# Patient Record
Sex: MALE | Race: BLACK OR AFRICAN AMERICAN | Employment: OTHER | URBAN - METROPOLITAN AREA
[De-identification: names, ages, dates, MRNs, and addresses within clinical notes are randomized per-mention and may not be internally consistent; named-entity substitution may affect disease eponyms.]

---

## 2017-01-20 ENCOUNTER — HOSPITAL ENCOUNTER (OUTPATIENT)
Dept: CT IMAGING | Age: 72
End: 2017-01-20
Attending: INTERNAL MEDICINE
Payer: MEDICARE

## 2017-01-20 ENCOUNTER — HOSPITAL ENCOUNTER (OUTPATIENT)
Dept: CT IMAGING | Age: 72
Discharge: HOME | End: 2017-01-20
Attending: INTERNAL MEDICINE
Payer: MEDICARE

## 2017-01-20 DIAGNOSIS — C61 PROSTATE CANCER (HCC): ICD-10-CM

## 2017-01-20 LAB — CREAT UR-MCNC: 1 MG/DL (ref 0.6–1.3)

## 2017-01-20 PROCEDURE — 74011000255 HC RX REV CODE- 255: Performed by: INTERNAL MEDICINE

## 2017-01-20 PROCEDURE — 74177 CT ABD & PELVIS W/CONTRAST: CPT

## 2017-01-20 PROCEDURE — 74011636320 HC RX REV CODE- 636/320: Performed by: INTERNAL MEDICINE

## 2017-01-20 PROCEDURE — 82565 ASSAY OF CREATININE: CPT

## 2017-01-20 RX ORDER — BARIUM SULFATE 20 MG/ML
900 SUSPENSION ORAL
Status: COMPLETED | OUTPATIENT
Start: 2017-01-20 | End: 2017-01-20

## 2017-01-20 RX ADMIN — BARIUM SULFATE 900 ML: 21 SUSPENSION ORAL at 12:13

## 2017-01-20 RX ADMIN — IOPAMIDOL 80 ML: 612 INJECTION, SOLUTION INTRAVENOUS at 12:14

## 2017-01-23 ENCOUNTER — HOSPITAL ENCOUNTER (OUTPATIENT)
Dept: NUCLEAR MEDICINE | Age: 72
Discharge: HOME | End: 2017-01-23
Attending: INTERNAL MEDICINE
Payer: MEDICARE

## 2017-01-23 DIAGNOSIS — C61 PROSTATE CANCER (HCC): ICD-10-CM

## 2017-01-23 PROCEDURE — 78306 BONE IMAGING WHOLE BODY: CPT

## 2017-02-20 ENCOUNTER — HOSPITAL ENCOUNTER (INPATIENT)
Age: 72
LOS: 4 days | Discharge: HOME HEALTH | DRG: 190 | End: 2017-02-24
Attending: EMERGENCY MEDICINE | Admitting: HOSPITALIST
Payer: MEDICARE

## 2017-02-20 ENCOUNTER — APPOINTMENT (OUTPATIENT)
Dept: GENERAL RADIOLOGY | Age: 72
DRG: 190 | End: 2017-02-20
Attending: EMERGENCY MEDICINE
Payer: MEDICARE

## 2017-02-20 ENCOUNTER — APPOINTMENT (OUTPATIENT)
Dept: CT IMAGING | Age: 72
DRG: 190 | End: 2017-02-20
Attending: EMERGENCY MEDICINE
Payer: MEDICARE

## 2017-02-20 DIAGNOSIS — J44.1 ACUTE EXACERBATION OF CHRONIC OBSTRUCTIVE PULMONARY DISEASE (COPD) (HCC): ICD-10-CM

## 2017-02-20 DIAGNOSIS — J96.02 ACUTE RESPIRATORY FAILURE WITH HYPERCAPNIA (HCC): ICD-10-CM

## 2017-02-20 DIAGNOSIS — G40.909 RECURRENT SEIZURES (HCC): Primary | ICD-10-CM

## 2017-02-20 DIAGNOSIS — R79.1 SUPRATHERAPEUTIC INR: ICD-10-CM

## 2017-02-20 LAB
ALBUMIN SERPL BCP-MCNC: 3 G/DL (ref 3.4–5)
ALBUMIN/GLOB SERPL: 0.8 {RATIO} (ref 0.8–1.7)
ALP SERPL-CCNC: 56 U/L (ref 45–117)
ALT SERPL-CCNC: 34 U/L (ref 16–61)
ANION GAP BLD CALC-SCNC: 9 MMOL/L (ref 3–18)
APPEARANCE UR: CLEAR
APTT PPP: 34.5 SEC (ref 23–36.4)
ARTERIAL PATENCY WRIST A: YES
AST SERPL W P-5'-P-CCNC: 33 U/L (ref 15–37)
ATRIAL RATE: 111 BPM
BACTERIA URNS QL MICRO: ABNORMAL /HPF
BASE EXCESS BLD CALC-SCNC: 1 MMOL/L
BASOPHILS # BLD AUTO: 0 K/UL (ref 0–0.06)
BASOPHILS # BLD: 0 % (ref 0–2)
BDY SITE: ABNORMAL
BILIRUB SERPL-MCNC: 0.3 MG/DL (ref 0.2–1)
BILIRUB UR QL: NEGATIVE
BNP SERPL-MCNC: 4179 PG/ML (ref 0–900)
BODY TEMPERATURE: 98.6
BUN SERPL-MCNC: 24 MG/DL (ref 7–18)
BUN/CREAT SERPL: 21 (ref 12–20)
CALCIUM SERPL-MCNC: 8.6 MG/DL (ref 8.5–10.1)
CALCULATED R AXIS, ECG10: 58 DEGREES
CALCULATED T AXIS, ECG11: 118 DEGREES
CAOX CRY URNS QL MICRO: ABNORMAL
CHLORIDE SERPL-SCNC: 107 MMOL/L (ref 100–108)
CK MB CFR SERPL CALC: 3.2 % (ref 0–4)
CK MB CFR SERPL CALC: 4.1 % (ref 0–4)
CK MB SERPL-MCNC: 3 NG/ML (ref 0.5–3.6)
CK MB SERPL-MCNC: 3.3 NG/ML (ref 0.5–3.6)
CK SERPL-CCNC: 81 U/L (ref 39–308)
CK SERPL-CCNC: 95 U/L (ref 39–308)
CO2 SERPL-SCNC: 27 MMOL/L (ref 21–32)
COLOR UR: YELLOW
CREAT SERPL-MCNC: 1.15 MG/DL (ref 0.6–1.3)
DIAGNOSIS, 93000: NORMAL
DIFFERENTIAL METHOD BLD: ABNORMAL
EOSINOPHIL # BLD: 0 K/UL (ref 0–0.4)
EOSINOPHIL NFR BLD: 0 % (ref 0–5)
EPITH CASTS URNS QL MICRO: ABNORMAL /LPF (ref 0–5)
ERYTHROCYTE [DISTWIDTH] IN BLOOD BY AUTOMATED COUNT: 16.6 % (ref 11.6–14.5)
GAS FLOW.O2 O2 DELIVERY SYS: ABNORMAL L/MIN
GAS FLOW.O2 SETTING OXYMISER: 3 L/M
GLOBULIN SER CALC-MCNC: 3.9 G/DL (ref 2–4)
GLUCOSE SERPL-MCNC: 110 MG/DL (ref 74–99)
GLUCOSE UR STRIP.AUTO-MCNC: NEGATIVE MG/DL
GRAN CASTS URNS QL MICRO: ABNORMAL /LPF
HCO3 BLD-SCNC: 27.8 MMOL/L (ref 22–26)
HCT VFR BLD AUTO: 35.4 % (ref 36–48)
HGB BLD-MCNC: 10.9 G/DL (ref 13–16)
HGB UR QL STRIP: ABNORMAL
HYALINE CASTS URNS QL MICRO: ABNORMAL /LPF (ref 0–2)
INR PPP: 5.6 (ref 0.8–1.2)
KETONES UR QL STRIP.AUTO: NEGATIVE MG/DL
LACTATE BLD-SCNC: 1.5 MMOL/L (ref 0.4–2)
LACTATE BLD-SCNC: 7.4 MMOL/L (ref 0.4–2)
LEUKOCYTE ESTERASE UR QL STRIP.AUTO: NEGATIVE
LYMPHOCYTES # BLD AUTO: 17 % (ref 21–52)
LYMPHOCYTES # BLD: 1.2 K/UL (ref 0.9–3.6)
MCH RBC QN AUTO: 26.6 PG (ref 24–34)
MCHC RBC AUTO-ENTMCNC: 30.8 G/DL (ref 31–37)
MCV RBC AUTO: 86.3 FL (ref 74–97)
MONOCYTES # BLD: 0.4 K/UL (ref 0.05–1.2)
MONOCYTES NFR BLD AUTO: 6 % (ref 3–10)
MUCOUS THREADS URNS QL MICRO: ABNORMAL /LPF
NEUTS SEG # BLD: 5.4 K/UL (ref 1.8–8)
NEUTS SEG NFR BLD AUTO: 77 % (ref 40–73)
NITRITE UR QL STRIP.AUTO: NEGATIVE
O2/TOTAL GAS SETTING VFR VENT: 32 %
PCO2 BLD: 57.3 MMHG (ref 35–45)
PH BLD: 7.29 [PH] (ref 7.35–7.45)
PH UR STRIP: 5 [PH] (ref 5–8)
PLATELET # BLD AUTO: 218 K/UL (ref 135–420)
PMV BLD AUTO: 9.5 FL (ref 9.2–11.8)
PO2 BLD: 71 MMHG (ref 80–100)
POTASSIUM SERPL-SCNC: 3.7 MMOL/L (ref 3.5–5.5)
PROT SERPL-MCNC: 6.9 G/DL (ref 6.4–8.2)
PROT UR STRIP-MCNC: 100 MG/DL
PROTHROMBIN TIME: 47.4 SEC (ref 11.5–15.2)
Q-T INTERVAL, ECG07: 362 MS
QRS DURATION, ECG06: 72 MS
QTC CALCULATION (BEZET), ECG08: 503 MS
RBC # BLD AUTO: 4.1 M/UL (ref 4.7–5.5)
RBC #/AREA URNS HPF: NEGATIVE /HPF (ref 0–5)
SAO2 % BLD: 92 % (ref 92–97)
SERVICE CMNT-IMP: ABNORMAL
SODIUM SERPL-SCNC: 143 MMOL/L (ref 136–145)
SP GR UR REFRACTOMETRY: >1.03 (ref 1–1.03)
SPECIMEN TYPE: ABNORMAL
TOTAL RESP. RATE, ITRR: 23
TROPONIN I SERPL-MCNC: 0.05 NG/ML (ref 0–0.04)
TROPONIN I SERPL-MCNC: 0.09 NG/ML (ref 0–0.04)
UROBILINOGEN UR QL STRIP.AUTO: 1 EU/DL (ref 0.2–1)
VENTRICULAR RATE, ECG03: 116 BPM
WBC # BLD AUTO: 6.9 K/UL (ref 4.6–13.2)
WBC URNS QL MICRO: ABNORMAL /HPF (ref 0–4)

## 2017-02-20 PROCEDURE — 74011250636 HC RX REV CODE- 250/636

## 2017-02-20 PROCEDURE — 74011250636 HC RX REV CODE- 250/636: Performed by: PHYSICIAN ASSISTANT

## 2017-02-20 PROCEDURE — 74011250637 HC RX REV CODE- 250/637: Performed by: INTERNAL MEDICINE

## 2017-02-20 PROCEDURE — 94640 AIRWAY INHALATION TREATMENT: CPT

## 2017-02-20 PROCEDURE — 74011636637 HC RX REV CODE- 636/637: Performed by: EMERGENCY MEDICINE

## 2017-02-20 PROCEDURE — 77030013140 HC MSK NEB VYRM -A

## 2017-02-20 PROCEDURE — 96375 TX/PRO/DX INJ NEW DRUG ADDON: CPT

## 2017-02-20 PROCEDURE — 71010 XR CHEST PORT: CPT

## 2017-02-20 PROCEDURE — 85025 COMPLETE CBC W/AUTO DIFF WBC: CPT | Performed by: EMERGENCY MEDICINE

## 2017-02-20 PROCEDURE — 80177 DRUG SCRN QUAN LEVETIRACETAM: CPT | Performed by: EMERGENCY MEDICINE

## 2017-02-20 PROCEDURE — 74011250637 HC RX REV CODE- 250/637: Performed by: PHYSICIAN ASSISTANT

## 2017-02-20 PROCEDURE — 83880 ASSAY OF NATRIURETIC PEPTIDE: CPT | Performed by: PHYSICIAN ASSISTANT

## 2017-02-20 PROCEDURE — 70450 CT HEAD/BRAIN W/O DYE: CPT

## 2017-02-20 PROCEDURE — 80053 COMPREHEN METABOLIC PANEL: CPT | Performed by: EMERGENCY MEDICINE

## 2017-02-20 PROCEDURE — 99285 EMERGENCY DEPT VISIT HI MDM: CPT

## 2017-02-20 PROCEDURE — 87040 BLOOD CULTURE FOR BACTERIA: CPT | Performed by: EMERGENCY MEDICINE

## 2017-02-20 PROCEDURE — 74011250636 HC RX REV CODE- 250/636: Performed by: EMERGENCY MEDICINE

## 2017-02-20 PROCEDURE — 85730 THROMBOPLASTIN TIME PARTIAL: CPT | Performed by: EMERGENCY MEDICINE

## 2017-02-20 PROCEDURE — 74011000250 HC RX REV CODE- 250: Performed by: EMERGENCY MEDICINE

## 2017-02-20 PROCEDURE — 36600 WITHDRAWAL OF ARTERIAL BLOOD: CPT

## 2017-02-20 PROCEDURE — 85610 PROTHROMBIN TIME: CPT | Performed by: EMERGENCY MEDICINE

## 2017-02-20 PROCEDURE — 36415 COLL VENOUS BLD VENIPUNCTURE: CPT | Performed by: PHYSICIAN ASSISTANT

## 2017-02-20 PROCEDURE — 74011000258 HC RX REV CODE- 258: Performed by: PHYSICIAN ASSISTANT

## 2017-02-20 PROCEDURE — 82803 BLOOD GASES ANY COMBINATION: CPT

## 2017-02-20 PROCEDURE — 83605 ASSAY OF LACTIC ACID: CPT

## 2017-02-20 PROCEDURE — 74011000250 HC RX REV CODE- 250: Performed by: PHYSICIAN ASSISTANT

## 2017-02-20 PROCEDURE — 65660000000 HC RM CCU STEPDOWN

## 2017-02-20 PROCEDURE — 96365 THER/PROPH/DIAG IV INF INIT: CPT

## 2017-02-20 PROCEDURE — 82550 ASSAY OF CK (CPK): CPT | Performed by: PHYSICIAN ASSISTANT

## 2017-02-20 PROCEDURE — 93005 ELECTROCARDIOGRAM TRACING: CPT

## 2017-02-20 PROCEDURE — 81001 URINALYSIS AUTO W/SCOPE: CPT | Performed by: EMERGENCY MEDICINE

## 2017-02-20 RX ORDER — PREDNISONE 20 MG/1
60 TABLET ORAL
Status: COMPLETED | OUTPATIENT
Start: 2017-02-20 | End: 2017-02-20

## 2017-02-20 RX ORDER — FAMOTIDINE 20 MG/1
40 TABLET, FILM COATED ORAL 2 TIMES DAILY
Status: DISCONTINUED | OUTPATIENT
Start: 2017-02-20 | End: 2017-02-24 | Stop reason: HOSPADM

## 2017-02-20 RX ORDER — LOSARTAN POTASSIUM 50 MG/1
50 TABLET ORAL DAILY
COMMUNITY

## 2017-02-20 RX ORDER — ALBUTEROL SULFATE 0.83 MG/ML
2.5 SOLUTION RESPIRATORY (INHALATION)
Status: DISCONTINUED | OUTPATIENT
Start: 2017-02-20 | End: 2017-02-24 | Stop reason: HOSPADM

## 2017-02-20 RX ORDER — DILTIAZEM HYDROCHLORIDE 30 MG/1
30 TABLET, FILM COATED ORAL EVERY 6 HOURS
Status: DISCONTINUED | OUTPATIENT
Start: 2017-02-20 | End: 2017-02-21

## 2017-02-20 RX ORDER — LEVETIRACETAM 500 MG/5ML
INJECTION, SOLUTION, CONCENTRATE INTRAVENOUS
Status: COMPLETED
Start: 2017-02-20 | End: 2017-02-20

## 2017-02-20 RX ORDER — TAMSULOSIN HYDROCHLORIDE 0.4 MG/1
0.4 CAPSULE ORAL DAILY
Status: DISCONTINUED | OUTPATIENT
Start: 2017-02-21 | End: 2017-02-24 | Stop reason: HOSPADM

## 2017-02-20 RX ORDER — ONDANSETRON 2 MG/ML
4 INJECTION INTRAMUSCULAR; INTRAVENOUS
Status: DISCONTINUED | OUTPATIENT
Start: 2017-02-20 | End: 2017-02-24 | Stop reason: HOSPADM

## 2017-02-20 RX ORDER — LOSARTAN POTASSIUM 50 MG/1
50 TABLET ORAL DAILY
Status: DISCONTINUED | OUTPATIENT
Start: 2017-02-21 | End: 2017-02-24 | Stop reason: HOSPADM

## 2017-02-20 RX ORDER — LEVOFLOXACIN 5 MG/ML
500 INJECTION, SOLUTION INTRAVENOUS EVERY 24 HOURS
Status: DISCONTINUED | OUTPATIENT
Start: 2017-02-20 | End: 2017-02-24 | Stop reason: CLARIF

## 2017-02-20 RX ORDER — PREDNISONE 20 MG/1
20 TABLET ORAL DAILY
COMMUNITY
End: 2017-02-24

## 2017-02-20 RX ORDER — LEVETIRACETAM 10 MG/ML
1000 INJECTION INTRAVASCULAR ONCE
Status: ACTIVE | OUTPATIENT
Start: 2017-02-20 | End: 2017-02-20

## 2017-02-20 RX ORDER — IPRATROPIUM BROMIDE AND ALBUTEROL SULFATE 2.5; .5 MG/3ML; MG/3ML
3 SOLUTION RESPIRATORY (INHALATION)
Status: DISCONTINUED | OUTPATIENT
Start: 2017-02-20 | End: 2017-02-24 | Stop reason: HOSPADM

## 2017-02-20 RX ORDER — DILTIAZEM HYDROCHLORIDE 5 MG/ML
10 INJECTION INTRAVENOUS ONCE
Status: COMPLETED | OUTPATIENT
Start: 2017-02-20 | End: 2017-02-20

## 2017-02-20 RX ORDER — IPRATROPIUM BROMIDE AND ALBUTEROL SULFATE 2.5; .5 MG/3ML; MG/3ML
3 SOLUTION RESPIRATORY (INHALATION)
Status: COMPLETED | OUTPATIENT
Start: 2017-02-20 | End: 2017-02-20

## 2017-02-20 RX ORDER — ACETAMINOPHEN 325 MG/1
650 TABLET ORAL
Status: DISCONTINUED | OUTPATIENT
Start: 2017-02-20 | End: 2017-02-24 | Stop reason: HOSPADM

## 2017-02-20 RX ORDER — RANITIDINE 150 MG/1
150 TABLET, FILM COATED ORAL 2 TIMES DAILY
COMMUNITY

## 2017-02-20 RX ADMIN — DILTIAZEM HYDROCHLORIDE 30 MG: 30 TABLET, FILM COATED ORAL at 18:10

## 2017-02-20 RX ADMIN — LEVETIRACETAM 1000 MG: 100 INJECTION, SOLUTION INTRAVENOUS at 04:48

## 2017-02-20 RX ADMIN — SODIUM CHLORIDE 1000 ML: 900 INJECTION, SOLUTION INTRAVENOUS at 04:37

## 2017-02-20 RX ADMIN — SODIUM CHLORIDE 5 MG/HR: 900 INJECTION, SOLUTION INTRAVENOUS at 13:39

## 2017-02-20 RX ADMIN — LEVETIRACETAM 750 MG: 250 TABLET, FILM COATED ORAL at 22:08

## 2017-02-20 RX ADMIN — IPRATROPIUM BROMIDE AND ALBUTEROL SULFATE 3 ML: .5; 3 SOLUTION RESPIRATORY (INHALATION) at 09:43

## 2017-02-20 RX ADMIN — ALBUTEROL SULFATE 2.5 MG: 2.5 SOLUTION RESPIRATORY (INHALATION) at 09:48

## 2017-02-20 RX ADMIN — IPRATROPIUM BROMIDE AND ALBUTEROL SULFATE 3 ML: .5; 3 SOLUTION RESPIRATORY (INHALATION) at 21:45

## 2017-02-20 RX ADMIN — PREDNISONE 60 MG: 20 TABLET ORAL at 09:47

## 2017-02-20 RX ADMIN — LEVOFLOXACIN 500 MG: 5 INJECTION, SOLUTION INTRAVENOUS at 21:45

## 2017-02-20 RX ADMIN — DILTIAZEM HYDROCHLORIDE 10 MG: 5 INJECTION INTRAVENOUS at 13:38

## 2017-02-20 RX ADMIN — FAMOTIDINE 40 MG: 20 TABLET ORAL at 22:08

## 2017-02-20 RX ADMIN — PHYTONADIONE 10 MG: 10 INJECTION, EMULSION INTRAMUSCULAR; INTRAVENOUS; SUBCUTANEOUS at 13:28

## 2017-02-20 NOTE — ED NOTES
Purposeful rounding completed:    Side rails up x 2:  YES  Bed in low position and wheels locked: YES  Call bell within reach: YES  Comfort addressed: YES    Toileting needs addressed: YES  Plan of care reviewed/updated with patient and or family members: YES  IV site assessed: YES  Pain assessed and addressed: YES, 0  Pt used the urinal. Urine specimen obtained and sent to the lab.

## 2017-02-20 NOTE — H&P
2 [facility]  Hospitalist Division      History & Physical    Patient: Ellen Fletcher MRN: [id number]  [facility]: [id number]    YOB: 1945  Age: 66 y.o. Sex: male    DOA: 2/20/2017 LOS:  LOS: 0 days        DOA:  2/20/2017  PCP:  Rodolfo So MD    Chief Complaint:  Seizure    Assessment/ Plan:     Patient Active Problem List   Diagnosis Code    Malignant neoplasm of prostate ([facility] C61    History of pulmonary embolism Z86.711    Seizure ([facility] R56.9    HTN (hypertension) I10    [facility] J44.9    COPD with acute exacerbation ([facility] J44.1       A/P:  - New onset AFIB with RVR - Continue Cardizem gtt. Check Echo  - Acute COPD exacerbation - Continue scheduled Duo-Nebs/ solumedrol. Start Levaquin now for exacerbation and if there is any concern of aspiration with seizure this am.   - Acute on chronic Hypoxic respiratory failure on Home O2  - Seizure disorder - Keppra bolus in the ER. Continue Keppra regimen.   - Coagulopathy - With seizure precautions at this point, give one dose Vitamin K now and follow INR.   - Hx of PE in 2015 - Hold Coumadin for now with elevated INR  - HTN - Continue Cozaar  - BPH - Continue Flomax  - SCD's for DVT Prophylaxis      HPI:     Ellen Fletcher is a 66 y.o. male with a hx of COPD, Chronic Hypoxic respiratory failure on Home O2, HTN, Prostate Cancer, and seizure disorder who was admitted to [facility] on 2/20/17 for AFIB with RVR and a COPD exacerbation. He was initially brought in to the ER this morning after having a witnessed seizure at home and was brought in by EMS. His wife states he has been taking his Keppra as prescribed and ran out of it yesterday. He denies any headache, fever, chills, vision changes, numbness or weakness. When he woke up from his post-ictal state he was wheezing and experiencing some shortness of breath. He denied any chest pain.  He was given Neb treatments and he improved. He is chronically on Home O2 and has been taking a course of steroids as prescribed by his PCP with [facility]. On top of his other issues it was noted that with activity his heart rate jumped up to the 150's. An EKG then revealed AFIB with RVR and he was started on Cardizem. He will be admitted to Telemetry for further evaluation and treatment. Past Medical History   Diagnosis Date    Anemia     COPD (chronic obstructive pulmonary disease) ([facility]     Dyspepsia and other specified disorders of function of stomach     Elevated PSA     Emphysema     Hypertension     Prostate cancer ([facility] [address] 9/27/10       Past Surgical History   Procedure Laterality Date    Hx urological  9/27/10     Prostate Bx, Dr. Willis Host       Family History   Problem Relation Age of Onset    No Known Problems Mother     No Known Problems Father        Social History     Social History    Marital status:      Spouse name: N/A    Number of children: N/A    Years of education: N/A     Social History Main Topics    Smoking status: Former Smoker     Types: Cigarettes    Smokeless tobacco: Never Used    Alcohol use No    Drug use: No    Sexual activity: No     Other Topics Concern    None     Social History Narrative       Prior to Admission medications    Medication Sig Start Date End Date Taking? Authorizing Provider   losartan (COZAAR) 50 mg tablet Take 50 mg by mouth daily. Yes Biju Sommers MD   raNITIdine (ZANTAC) 150 mg tablet Take 150 mg by mouth two (2) times a day. Yes Biju Sommers MD   predniSONE (DELTASONE) 20 mg tablet Take 20 mg by mouth daily. Yes Biju Sommers MD   levETIRAcetam (KEPPRA) 750 mg tablet Take 1 Tab by mouth two (2) times a day. 9/15/16  Yes Rita Deng MD   tamsulosin [facility]) 0.4 mg capsule Take 1 Cap by mouth daily. 7/21/16  Yes Brian Sanchez NP   warfarin (COUMADIN) 1 mg tablet Take 6 mg by mouth daily.    Yes Biju Sommers MD       No Known Allergies    Review of Systems:  - fever, - chills, - fatigue, - weight loss, - night sweats   - sore throat, - sinus congestion, - lymphadenopathy, - vision changes  - CP, -  palpitations  + shortness of breath, - cough, - hemoptysis  - nausea, - vomiting, - diarrhea, - abdominal pain, - reflux, - dysphagia  - dysuria, - hematuria, - urinary frequency  - rash, - pruritis  - back pain, - neck pain, - myalgia, - arthralgia  - H/A, - numbness, - tingling, - weakness, - slurred speech    Physical Exam:      Visit Vitals    BP (!) 144/92    Pulse 85    Temp 97.5 °F (36.4 °C)    Resp 18    Wt 83.9 kg (185 lb)    SpO2 98%    BMI 23.75 kg/m2       Physical Exam:  Gen: In general, this is a well nourished elderly male, in no acute distress on NC.  HEENT: Sclerae nonicteric. Oral mucous membranes moist.    Neck: Supple with midline trachea. CV: Irregularly irregular without murmur or rub appreciated. Resp:Respirations are unlabored without use of accessory muscles. Lung fields bilaterally with few expiratory wheezes bilateral lung fields. Abd: Soft, nontender, nondistended. Extrem: Extremities are warm, without cyanosis or clubbing. No pitting pretibial edema. Palpable distal pulses X 4.   Skin: Warm, no visible rashes. Neuro: Patient is alert, oriented, and cooperative. No obvious focal defects. Moves all 4 extremities.     Labs Reviewed:    Recent Results (from the past 24 hour(s))   CULTURE, BLOOD    Collection Time: 02/20/17  3:58 AM   Result Value Ref Range    Special Requests: NO SPECIAL REQUESTS      Culture result: NO GROWTH AFTER 4 HOURS     CBC WITH AUTOMATED DIFF    Collection Time: 02/20/17  4:00 AM   Result Value Ref Range    WBC 6.9 4.6 - 13.2 K/uL    RBC 4.10 (L) 4.70 - 5.50 M/uL    HGB 10.9 (L) 13.0 - 16.0 g/dL    HCT 35.4 (L) 36.0 - 48.0 %    MCV 86.3 74.0 - 97.0 FL    MCH 26.6 24.0 - 34.0 PG    MCHC 30.8 (L) 31.0 - 37.0 g/dL    RDW 16.6 (H) 11.6 - 14.5 %    PLATELET 826 505 - 301 K/uL    MPV 9.5 9.2 - 11.8 FL    NEUTROPHILS 77 (H) 40 - 73 %    LYMPHOCYTES 17 (L) 21 - 52 %    MONOCYTES 6 3 - 10 %    EOSINOPHILS 0 0 - 5 %    BASOPHILS 0 0 - 2 %    ABS. NEUTROPHILS 5.4 1.8 - 8.0 K/UL    ABS. LYMPHOCYTES 1.2 0.9 - 3.6 K/UL    ABS. MONOCYTES 0.4 0.05 - 1.2 K/UL    ABS. EOSINOPHILS 0.0 0.0 - 0.4 K/UL    ABS. BASOPHILS 0.0 0.0 - 0.06 K/UL    DF AUTOMATED     METABOLIC PANEL, COMPREHENSIVE    Collection Time: 02/20/17  4:00 AM   Result Value Ref Range    Sodium 143 136 - 145 mmol/L    Potassium 3.7 3.5 - 5.5 mmol/L    Chloride 107 100 - 108 mmol/L    CO2 27 21 - 32 mmol/L    Anion gap 9 3.0 - 18 mmol/L    Glucose 110 (H) 74 - 99 mg/dL    BUN 24 (H) 7.0 - 18 MG/DL    Creatinine 1.15 0.6 - 1.3 MG/DL    BUN/Creatinine ratio 21 (H) 12 - 20      GFR est AA >60 >60 ml/min/1.73m2    GFR est non-AA >60 >60 ml/min/1.73m2    Calcium 8.6 8.5 - 10.1 MG/DL    Bilirubin, total 0.3 0.2 - 1.0 MG/DL    ALT (SGPT) 34 16 - 61 U/L    AST (SGOT) 33 15 - 37 U/L    Alk.  phosphatase 56 45 - 117 U/L    Protein, total 6.9 6.4 - 8.2 g/dL    Albumin 3.0 (L) 3.4 - 5.0 g/dL    Globulin 3.9 2.0 - 4.0 g/dL    A-G Ratio 0.8 0.8 - 1.7     PROTHROMBIN TIME + INR    Collection Time: 02/20/17  4:00 AM   Result Value Ref Range    Prothrombin time 47.4 (H) 11.5 - 15.2 sec    INR 5.6 (HH) 0.8 - 1.2     PTT    Collection Time: 02/20/17  4:00 AM   Result Value Ref Range    aPTT 34.5 23.0 - 36.4 SEC   CULTURE, BLOOD    Collection Time: 02/20/17  4:06 AM   Result Value Ref Range    Special Requests: NO SPECIAL REQUESTS      Culture result: NO GROWTH AFTER 4 HOURS     POC LACTIC ACID    Collection Time: 02/20/17  4:28 AM   Result Value Ref Range    Lactic Acid (POC) 7.4 (HH) 0.4 - 2.0 mmol/L   URINALYSIS W/ RFLX MICROSCOPIC    Collection Time: 02/20/17  5:09 AM   Result Value Ref Range    Color YELLOW      Appearance CLEAR      Specific gravity >1.030 (H) 1.005 - 1.030    pH (UA) 5.0 5.0 - 8.0      Protein 100 (A) NEG mg/dL    Glucose NEGATIVE  NEG mg/dL    Ketone NEGATIVE  NEG mg/dL Bilirubin NEGATIVE  NEG      Blood TRACE (A) NEG      Urobilinogen 1.0 0.2 - 1.0 EU/dL    Nitrites NEGATIVE  NEG      Leukocyte Esterase NEGATIVE  NEG     URINE MICROSCOPIC ONLY    Collection Time: 02/20/17  5:09 AM   Result Value Ref Range    WBC 0 to 3 0 - 4 /hpf    RBC NEGATIVE  0 - 5 /hpf    Epithelial cells FEW 0 - 5 /lpf    Bacteria FEW (A) NEG /hpf    Mucus FEW (A) NEG /lpf    CA Oxalate crystals FEW (A) NEG      Hyaline cast 4 to 10 0 - 2 /lpf    Granular cast 0 to 1 NEG /lpf   POC LACTIC ACID    Collection Time: 02/20/17  7:11 AM   Result Value Ref Range    Lactic Acid (POC) 1.5 0.4 - 2.0 mmol/L   POC G3    Collection Time: 02/20/17 11:39 AM   Result Value Ref Range    Device: NASAL CANNULA      Flow rate (POC) 3 L/M    FIO2 (POC) 32 %    pH (POC) 7.294 (L) 7.35 - 7.45      pCO2 (POC) 57.3 (H) 35.0 - 45.0 MMHG    pO2 (POC) 71 (L) 80 - 100 MMHG    HCO3 (POC) 27.8 (H) 22 - 26 MMOL/L    sO2 (POC) 92 92 - 97 %    Base excess (POC) 1 mmol/L    Allens test (POC) YES      Total resp. rate 23      Site RIGHT RADIAL      Patient temp.  98.6      Specimen type (POC) ARTERIAL      Performed by Loran Meckel    EKG, 12 LEAD, SUBSEQUENT    Collection Time: 02/20/17 12:53 PM   Result Value Ref Range    Ventricular Rate 116 BPM    Atrial Rate 111 BPM    QRS Duration 72 ms    Q-T Interval 362 ms    QTC Calculation (Bezet) 503 ms    Calculated R Axis 58 degrees    Calculated T Axis 118 degrees    Diagnosis       Atrial fibrillation with rapid ventricular response with premature   ventricular or aberrantly conducted complexes  Nonspecific ST and T wave abnormality , probably digitalis effect  Abnormal ECG  When compared with ECG of 16-DEC-2016 07:01,  Atrial fibrillation has replaced Sinus rhythm     PRO-BNP    Collection Time: 02/20/17  1:31 PM   Result Value Ref Range    NT pro-BNP 4179 (H) 0 - 900 PG/ML   CARDIAC PANEL,(CK, CKMB & TROPONIN)    Collection Time: 02/20/17  1:31 PM   Result Value Ref Range    CK 81 39 - 308 U/L    CK - MB 3.3 0.5 - 3.6 ng/ml    CK-MB Index 4.1 (H) 0.0 - 4.0 %    Troponin-I, Qt. 0.09 (H) 0.0 - 0.045 NG/ML       Imaging Reviewed:    CXR  2/20/17  IMPRESSION:  No acute changes. Extensive bilateral granulomatous change and pleural  calcifications along with chronic pleural thickening and volume loss left  hemithorax    CT Head  2/20/17  IMPRESSION:   1. Nonspecific deep white matter changes about the same. This could represent  ischemic demyelination. 2. Old right basal ganglia/internal capsule lacunar infarction. 3. No acute bleed or acute CVA. 4. Cerebral atherosclerosis      [facility]  Hospitalist Division  Pager:  [phone]  Office:  [phone]

## 2017-02-20 NOTE — CONSULTS
[facility] - Consult Note    Date of  Admission: 2/20/2017  4:02 AM   Primary Care Physician:  Joselin Erazo MD    I saw, evaluated, interviewed and examined the patient personally. I agree with the findings and plan of care as documented below with PAClaudiaC note  Some changes made to original note. Transition to PO CCB ( done)  Wean off IV CCB drip once oral dose started  ECHO to eval EF  Already on coumadin. Marivel Gallardo MD           Assessment:     - Atrial fibrillation with RVR. New diagnosis. CHADS score: 1 (HTN) at this time. - Seizure disorder with witness seizure prior to admission  - Acute COPD exacerbation. On home O2 for COPD  - Hypertension   - History of pulmonary embolus  - BPH     Plan:     - Afib rate controlled now on Cardizem drip. Will add PO CCB and avoid BB for now with history of wheezing  - Already covered with Coumadin with history of PE. Supra-therapeutic INR 5.6 and Vitamin K given due to concerns with seizure   - Echocardiogram pending  - Respiratory treatments per primary team      History of Present Illness: This is a 66 y.o. male admitted for COPD with acute exacerbation ([facility] Patient complains of:  Seizure, chronic dyspnea     Mr. Princess Gregory is a very pleasant 66year old that presented to [facility] today due to witness seizure overnight. He has known seizure disorder and is on Keppra. He does not recall any events during this time, but his wife describes seeing him shaking violently and frothing at the mouth. He was post-ictal thereafter but then was experiencing some wheezing and shortness of breath. Cardiology is now participating in his care due to atrial fibrillation confirmed in the ED. Patient and wife deny any known history of afib. He is on Coumadin for prior PE in 2015. Denies history of stroke, CAD, or MI. He denies any recent chest pain, palpitations or syncope. He has chronic dyspnea and is on home O2.       Cardiac risk factors: male gender, hypertension      Review of Symptoms:  Except as stated above include:  Constitutional:  negative  Respiratory:  negative  Cardiovascular:  Per HPI   Gastrointestinal: negative  Genitourinary:  negative  Musculoskeletal:  Negative  Neurological:  Negative  Dermatological:  Negative  Endocrinological: Negative  Psychological:  Negative    A comprehensive review of systems was negative except for that written in the HPI.      Past Medical History:     Past Medical History   Diagnosis Date    Anemia     [facility]     Dyspepsia and other specified disorders of function of stomach     Elevated PSA     Emphysema     Hypertension     Prostate cancer ([facility] 9/27/10         Social History:     Social History     Social History    Marital status:      Spouse name: N/A    Number of children: N/A    Years of education: N/A     Social History Main Topics    Smoking status: Former Smoker     Types: Cigarettes    Smokeless tobacco: Never Used    Alcohol use No    Drug use: No    Sexual activity: No     Other Topics Concern    None     Social History Narrative        Family History:     Family History   Problem Relation Age of Onset    No Known Problems Mother     No Known Problems Father         Medications:   No Known Allergies     Current Facility-Administered Medications   Medication Dose Route Frequency    levETIRAcetam (KEPPRA) 1,000 mg in 100 ml IVPB  1,000 mg IntraVENous ONCE    albuterol (PROVENTIL VENTOLIN) nebulizer solution 2.5 mg  2.5 mg Nebulization Q15MIN PRN    dilTIAZem (CARDIZEM) 100 mg in 0.9% sodium chloride (MBP/ADV) 100 mL infusion  5 mg/hr IntraVENous CONTINUOUS         Physical Exam:     Visit Vitals    BP (!) 156/91 (BP 1 Location: Right arm)    Pulse (!) 102    Temp 97.9 °F (36.6 °C)    Resp 24    Wt 185 lb (83.9 kg)    SpO2 94%    BMI 23.75 kg/m2     BP Readings from Last 3 Encounters:   02/20/17 (!) 156/91   12/16/16 145/86 09/15/16 128/79     Pulse Readings from Last 3 Encounters:   02/20/17 (!) 102   12/16/16 (!) 102   09/15/16 86     Wt Readings from Last 3 Encounters:   02/20/17 185 lb (83.9 kg)   12/16/16 175 lb (79.4 kg)   09/15/16 190 lb (86.2 kg)       General:  fatigued, cooperative, no distress  Neck:  nontender, no JVD  Lungs:  Some coarse breath sounds  Heart:  regular rate and rhythm, S1, S2 normal, no murmur, click, rub or gallop  Abdomen:  abdomen is soft without significant tenderness, masses, organomegaly or guarding  Extremities:  extremities normal, atraumatic, no cyanosis or edema  Skin: Warm and dry.  no hyperpigmentation, vitiligo, or suspicious lesions  Neuro: alert, oriented x3, affect appropriate  Psych: non focal     Data Review:     Recent Labs      02/20/17   0400   WBC  6.9   HGB  10.9*   HCT  35.4*   PLT  218     Recent Labs      02/20/17   0400   NA  143   K  3.7   CL  107   CO2  27   GLU  110*   BUN  24*   CREA  1.15   CA  8.6   ALB  3.0*   SGOT  33   ALT  34   INR  5.6*       Results for orders placed or performed during the hospital encounter of 12/16/16   EKG, 12 LEAD, INITIAL   Result Value Ref Range    Ventricular Rate 100 BPM    Atrial Rate 100 BPM    P-R Interval 196 ms    QRS Duration 76 ms    Q-T Interval 362 ms    QTC Calculation (Bezet) 466 ms    Calculated P Axis 53 degrees    Calculated R Axis 38 degrees    Calculated T Axis 95 degrees    Diagnosis       Normal sinus rhythm  Septal infarct , age undetermined  Abnormal ECG  When compared with ECG of 15-SEP-2016 05:06,  premature supraventricular complexes are no longer present  Septal infarct is now present  Confirmed by Claudia Regalado ([id number]) on 12/16/2016 4:08:27 PM         All Cardiac Markers in the last 24 hours:    Lab Results   Component Value Date/Time    CPK 81 02/20/2017 01:31 PM    CKMB 3.3 02/20/2017 01:31 PM    CKND1 4.1 (H) 02/20/2017 01:31 PM    TROIQ 0.09 (H) 02/20/2017 01:31 PM       Last Lipid:    Lab Results   Component Value Date/Time    Cholesterol, total 166 04/29/2011 10:19 AM    HDL Cholesterol 57 04/29/2011 10:19 AM    LDL, calculated 95.6 04/29/2011 10:19 AM    Triglyceride 67 04/29/2011 10:19 AM    CHOL/HDL Ratio 2.9 04/29/2011 10:19 AM       Signed By: Colonel Ricci MD     February 20, 2017

## 2017-02-20 NOTE — PROGRESS NOTES
1700 Received patient from ER. Diagnosis of Afibb and COPD exacerbation. Patient is on seizure precautions and bed padded and in low position. Wife at bedside. Cardizem drip discontinued and stated on PO cardizem. Patient eating dinner at this time. Patient denies any pain or discomfort at this time. Assisted patient with the urinal. 300ml of clear/yellow urine voided. Will continue to monitor. 1830 All evening medication given and tolerated. IV sites flushed and capped. Patient now sleeping and bed in low position    1900 Bedside shift report given to [address] [place] [address] by Caprice Ley RN off going nurse.

## 2017-02-20 NOTE — ED TRIAGE NOTES
Patient had a witnessed seizure by wife. Patient was incont of urine. EMS found patient altered and lethargic.

## 2017-02-20 NOTE — ED NOTES
Called to bedside by nursing staff at 0930 hrs.: Patient elected to be discharged, has difficulty in breathing, saturation is normal on his normal amount of oxygen, he is sitting upright tachypneic speaking and moderately long sentences wheezing in all lung fields diminished bibasilar    Long history of tobacco use and COPD, does not follow with a pulmonologist we'll treat here with albuterol DuoNeb and prednisone monitor    ABG pH 7.294/57/71 on 3 L/m nasal cannula. This respiratory acidosis, patient will be admitted to medicine for further breathing treatments and management      Patient's presentation, history, physical exam and laboratory evaluations were reviewed. I felt the patient would benefit from inpatient management and treatment. Consult:  Discussed care with LIZETH morse. Standard discussion; including history of patients chief complaint, available diagnostic results, and treatment course. Patient was accepted to their service. Disposition:    Admitted to medicine service      Portions of this chart were created with Dragon medical speech to text program.   Unrecognized errors may be present.

## 2017-02-20 NOTE — Clinical Note
STOP YOUR WARFARIN FOR THE NEXT 2 DAYS. CALL YOUR PRIMARY CARE DOCTOR TODAY FOR APPOINTMENT IN THE NEXT 3 DAYS FOR WARFARIN BLOOD LEVEL CHECK. If you were prescribed any medication take as directed. Follow up with your primary care physician or with  specialist as directed. Return to the emergency room with any new or worsening conditions.

## 2017-02-20 NOTE — ED NOTES
Report given to Gaye Florence RN. Pt lying in bed, awake. No acute distress noted. Seizure precautions remained throughout shift. Family at bedside.

## 2017-02-20 NOTE — ED PROVIDER NOTES
HPI Comments: 4:04 AM Christianne Barnett is a 66 y.o. male w/ a hx of HTN, anemia, COPD, and prostate cancer presenting to the ED via EMS transport with seizures. EMS reports that the patient's wife states that she witnessed the seizure. Upon EMS arrival patient was mentally altered, lethargic, and incontinent of urine. Per wife, patient takes keppra seizure medication. Per recorded ED history, patient has been treated 4x for seizures and an anti-coagulant overdose in the past. The patient also c/o dizziness. There are no other concerns at this time. The history is provided by the patient. Past Medical History:   Diagnosis Date    Anemia     [facility]ronic obstructive [facility] disease) [facility]     Dyspepsia and other specified disorders of function of stomach     Elevated PSA     Emphysema     Hypertension     Prostate cancer ([facility] [address] 9/27/10       Past Surgical History:   Procedure Laterality Date    Hx urological  9/27/10     Prostate Bx, Dr. Nanci Thomas         Family History:   Problem Relation Age of Onset    No Known Problems Mother     No Known Problems Father        Social History     Social History    Marital status:      Spouse name: N/A    Number of children: N/A    Years of education: N/A     Occupational History    Not on file. Social History Main Topics    Smoking status: Former Smoker     Types: Cigarettes    Smokeless tobacco: Never Used    Alcohol use No    Drug use: No    Sexual activity: No     Other Topics Concern    Not on file     Social History Narrative         ALLERGIES: Review of patient's allergies indicates no known allergies. Review of Systems   Constitutional: Negative for fever. HENT: Negative for sore throat. Eyes: Negative for redness and visual disturbance. Respiratory: Negative for cough, shortness of breath and wheezing. Cardiovascular: Negative for chest pain. Gastrointestinal: Negative for abdominal pain and nausea.    Endocrine: Negative for polyuria. Genitourinary: Negative for dysuria. Musculoskeletal: Negative for arthralgias and neck stiffness. Skin: Negative for rash. Neurological: Positive for dizziness. Negative for headaches. All other systems reviewed and are negative. Vitals:    02/20/17 0530 02/20/17 0600 02/20/17 0715 02/20/17 0737   BP: (!) 157/93 (!) 146/97 (!) 126/97    Pulse: (!) 107 (!) 107 (!) 110    Resp: 20 25 22    Temp:    97.5 °F (36.4 °C)   SpO2:  100% 97%    Weight:                Physical Exam   Constitutional: He appears well-developed and well-nourished. No distress. HENT:   Head: Normocephalic and atraumatic. Mouth/Throat: Oropharynx is clear and moist.   Tongue laceration    Eyes: Conjunctivae are normal. Pupils are equal, round, and reactive to light. No scleral icterus. Neck: Normal range of motion. Neck supple. Cardiovascular: Intact distal pulses. Tachycardia present. Capillary refill < 3 seconds   Pulmonary/Chest: Effort normal. Tachypnea (mild) noted. No respiratory distress. He has no wheezes. He has rhonchi. Abdominal: Soft. Bowel sounds are normal. He exhibits no distension. There is no tenderness. Musculoskeletal: Normal range of motion. He exhibits no edema. No drifting of bilateral upper extremities, strength 5/5    Lymphadenopathy:     He has no cervical adenopathy. Neurological: He is alert. He has normal reflexes. No cranial nerve deficit. He exhibits normal muscle tone. Coordination normal.   Oriented x2, knows name and place but not the year. Appear post ictal.   Skin: Skin is warm and dry. He is not diaphoretic. Nursing note and vitals reviewed. MDM  Number of Diagnoses or Management Options  Recurrent seizures ([address]):   Supratherapeutic INR:   Diagnosis management comments: ddx recurrent seizure, metabolic, infectious, dehydration, cva    Tachycardia (appears post ictal). Pt nontoxic appearing.    Labs, IVF bolus, UA    I gave IV keppra dose in ED    LA 7.4 likely from seizure, consider infection  WBC wnl  UA (-)nitrites (-)bacteria  INR 5.6    Will get CT head in light of ams, elevated INR, will eval for ICH.      CxR nothing acute per my prelim read  Sammie Cardenas,     Pt got IVF    Repeat LA 1.5 as expected was elevated due to seizure    CT head nothing acute        Amount and/or Complexity of Data Reviewed  Clinical lab tests: ordered and reviewed  Tests in the radiology section of CPT®: ordered and reviewed  Tests in the medicine section of CPT®: ordered and reviewed    Risk of Complications, Morbidity, and/or Mortality  Presenting problems: high  Diagnostic procedures: moderate  Management options: moderate    Patient Progress  Patient progress: improved    ED Course       Procedures    Vitals:  Patient Vitals for the past 12 hrs:   Temp Pulse Resp BP SpO2   02/20/17 0737 97.5 °F (36.4 °C) - - - -   02/20/17 0715 - (!) 110 22 (!) 126/97 97 %   02/20/17 0600 - (!) 107 25 (!) 146/97 100 %   02/20/17 0530 - (!) 107 20 (!) 157/93 -   02/20/17 0500 - (!) 105 20 (!) 133/93 100 %   02/20/17 0430 - (!) 106 22 (!) 145/98 100 %   02/20/17 0415 - (!) 119 25 (!) 146/92 99 %         Medications ordered:   Medications   levETIRAcetam (KEPPRA) 1,000 mg in 100 ml IVPB ( IntraVENous Canceled Entry 2/20/17 0448)   sodium chloride 0.9 % bolus infusion 1,000 mL (0 mL IntraVENous IV Completed 2/20/17 0537)   sodium chloride 0.9 % bolus infusion 1,000 mL (0 mL IntraVENous IV Completed 2/20/17 0537)   levETIRAcetam (KEPPRA) 500 mg/5 mL injection (  IV Completed 2/20/17 0518)         Lab findings:  Recent Results (from the past 12 hour(s))   CBC WITH AUTOMATED DIFF    Collection Time: 02/20/17  4:00 AM   Result Value Ref Range    WBC 6.9 4.6 - 13.2 K/uL    RBC 4.10 (L) 4.70 - 5.50 M/uL    HGB 10.9 (L) 13.0 - 16.0 g/dL    HCT 35.4 (L) 36.0 - 48.0 %    MCV 86.3 74.0 - 97.0 FL    MCH 26.6 24.0 - 34.0 PG    MCHC 30.8 (L) 31.0 - 37.0 g/dL    RDW 16.6 (H) 11.6 - 14.5 %    PLATELET 798 855 - 420 K/uL    MPV 9.5 9.2 - 11.8 FL    NEUTROPHILS 77 (H) 40 - 73 %    LYMPHOCYTES 17 (L) 21 - 52 %    MONOCYTES 6 3 - 10 %    EOSINOPHILS 0 0 - 5 %    BASOPHILS 0 0 - 2 %    ABS. NEUTROPHILS 5.4 1.8 - 8.0 K/UL    ABS. LYMPHOCYTES 1.2 0.9 - 3.6 K/UL    ABS. MONOCYTES 0.4 0.05 - 1.2 K/UL    ABS. EOSINOPHILS 0.0 0.0 - 0.4 K/UL    ABS. BASOPHILS 0.0 0.0 - 0.06 K/UL    DF AUTOMATED     METABOLIC PANEL, COMPREHENSIVE    Collection Time: 02/20/17  4:00 AM   Result Value Ref Range    Sodium 143 136 - 145 mmol/L    Potassium 3.7 3.5 - 5.5 mmol/L    Chloride 107 100 - 108 mmol/L    CO2 27 21 - 32 mmol/L    Anion gap 9 3.0 - 18 mmol/L    Glucose 110 (H) 74 - 99 mg/dL    BUN 24 (H) 7.0 - 18 MG/DL    Creatinine 1.15 0.6 - 1.3 MG/DL    BUN/Creatinine ratio 21 (H) 12 - 20      GFR est AA >60 >60 ml/min/1.73m2    GFR est non-AA >60 >60 ml/min/1.73m2    Calcium 8.6 8.5 - 10.1 MG/DL    Bilirubin, total 0.3 0.2 - 1.0 MG/DL    ALT (SGPT) 34 16 - 61 U/L    AST (SGOT) 33 15 - 37 U/L    Alk.  phosphatase 56 45 - 117 U/L    Protein, total 6.9 6.4 - 8.2 g/dL    Albumin 3.0 (L) 3.4 - 5.0 g/dL    Globulin 3.9 2.0 - 4.0 g/dL    A-G Ratio 0.8 0.8 - 1.7     PROTHROMBIN TIME + INR    Collection Time: 02/20/17  4:00 AM   Result Value Ref Range    Prothrombin time 47.4 (H) 11.5 - 15.2 sec    INR 5.6 (HH) 0.8 - 1.2     PTT    Collection Time: 02/20/17  4:00 AM   Result Value Ref Range    aPTT 34.5 23.0 - 36.4 SEC   POC LACTIC ACID    Collection Time: 02/20/17  4:28 AM   Result Value Ref Range    Lactic Acid (POC) 7.4 (HH) 0.4 - 2.0 mmol/L   URINALYSIS W/ RFLX MICROSCOPIC    Collection Time: 02/20/17  5:09 AM   Result Value Ref Range    Color YELLOW      Appearance CLEAR      Specific gravity >1.030 (H) 1.005 - 1.030    pH (UA) 5.0 5.0 - 8.0      Protein 100 (A) NEG mg/dL    Glucose NEGATIVE  NEG mg/dL    Ketone NEGATIVE  NEG mg/dL    Bilirubin NEGATIVE  NEG      Blood TRACE (A) NEG      Urobilinogen 1.0 0.2 - 1.0 EU/dL    Nitrites NEGATIVE  NEG Leukocyte Esterase NEGATIVE  NEG     URINE MICROSCOPIC ONLY    Collection Time: 02/20/17  5:09 AM   Result Value Ref Range    WBC 0 to 3 0 - 4 /hpf    RBC NEGATIVE  0 - 5 /hpf    Epithelial cells FEW 0 - 5 /lpf    Bacteria FEW (A) NEG /hpf    Mucus FEW (A) NEG /lpf    CA Oxalate crystals FEW (A) NEG      Hyaline cast 4 to 10 0 - 2 /lpf    Granular cast 0 to 1 NEG /lpf   POC LACTIC ACID    Collection Time: 02/20/17  7:11 AM   Result Value Ref Range    Lactic Acid (POC) 1.5 0.4 - 2.0 mmol/L           X-Ray, CT or other radiology findings or impressions:  XR CHEST PORT  IMPRESSION:   Chest XR similar to old 12/16/16 Chest XR; no acute process  Dr. Ewa Jeffery preliminary reading   CT HEAD WO CONT  Findings:   No acute intracranial abnormality or significant interval change. No hemorrhage. No midline shift. No hydrocephalus. Stable chronic microvascular changes. Please note that CT is insensitive for acute infarct in the first 24-48hrs. Per Radiology         Progress notes, Consult notes or additional Procedure notes: Wife came to ED, states pt ran out of seizure meds recently. And she has been ill, was able to ensure he took correct meds. I explained pts elevated INR and to stop coumadin for 2 days and get immediate fu for recheck inr with pcp. Gave neurology fu. Pt and wife understand plan and agree. Reevaluation of patient:   I have reassessed the patient. I have discussed the workup, results and plan with the patient and patient is in agreement. Patient is feeling much better. Pt states to me \"thank you, and that I feels his normal self\". Wife expresses pt is baseline. Patient has keppra refills. Patient was discharge in stable condition. Patient was given outpatient follow up. Patient is to return to emergency department if any new or worsening condition. 7:18 AM February 20, 2017    Disposition:  Diagnosis:   1. Recurrent seizures ([address]    2.  Supratherapeutic INR        Disposition: discharged    Follow-up Information     Follow up With Details Comments Blaire Clay MD  Call today for follow up within next 3-4 days for warfarin blood level check. [address]      Andrews Sharma MD In 1 day for follow up for recurrent seizures [address]      [facility] EMERGENCY DEPT  As needed, If symptoms worsen [address]  [phone]            Patient's Medications   Start Taking    No medications on file   Continue Taking    AMLODIPINE (NORVASC) 5 MG TABLET    Take 5 mg by mouth daily. FINASTERIDE (PROSCAR) 5 MG TABLET    Take 1 Tab by mouth nightly. LEVETIRACETAM (KEPPRA) 750 MG TABLET    Take 1 Tab by mouth two (2) times a day. LOSARTAN (COZAAR) 50 MG TABLET    Take 25 mg by mouth daily. PANTOPRAZOLE (PROTONIX) 40 MG TABLET    Take 40 mg by mouth daily. PREDNISONE (DELTASONE) 20 MG TABLET    Take 20 mg by mouth daily. RANITIDINE (ZANTAC) 150 MG TABLET    Take 150 mg by mouth two (2) times a day. TAMSULOSIN (FLOMAX) 0.4 MG CAPSULE    Take 1 Cap by mouth daily. WARFARIN (COUMADIN) 1 MG TABLET    Take 6 mg by mouth daily. These Medications have changed    No medications on file   Stop Taking    No medications on file     Scribe Attestation:   Documented by: Rita Jenkins for, and in the presence of, Binu Leon DO February 20, 2017 at 4:20 AM      Signed by: Adrian Dean, February 20, 2017, 4:20 AM      Physician Attestation:   I personally performed the services described in this documentation, reviewed and edited the documentation which was dictated to the scribe in my presence, and it accurately records my words and actions.  Binu Leon DO  February 20, 2017 at 4:20 AM

## 2017-02-20 NOTE — ED NOTES
Attempted to get patient up, pt noted was tachypnea and SOB when going from a lying to sitting position. Noted with accessory muscle use. DR Otto Huynh notified of above.

## 2017-02-20 NOTE — ED NOTES
Attempted to get patient ready for discharge and transport home, upon sitting patient up to get dressed, pt noted with some difficulty breathing, tachypnea. DR Beatrice Kearns notified and to bedside to assess.

## 2017-02-20 NOTE — IP AVS SNAPSHOT
[address] [address] 
[phone] Patient: Leanne Lyn MRN: [id number] WTM:[id number] You are allergic to the following No active allergies Recent Documentation Height  
  
  
  
  
  
 1.88 m Unresulted Labs Order Current Status CULTURE, BLOOD Preliminary result CULTURE, BLOOD Preliminary result Emergency Contacts Name Discharge Info Relation Home Work Mobile Noé Umanzor DISCHARGE CAREGIVER [3] Spouse [3] [phone] About your hospitalization You were admitted on:  February 20, 2017 You last received care in the:  [facility] [address] You were discharged on:  February 24, 2017 Unit phone number:  [phone] Why you were hospitalized Your primary diagnosis was:  Not on File Your diagnoses also included:  Copd With Acute Exacerbation (Hcc) Providers Seen During Your Hospitalizations Provider Role Specialty Primary office phone Charley Diallo DO Attending Provider Emergency Medicine [phone] Reva Leon MD Attending Provider Emergency Medicine [phone] Elli Zheng MD Attending Provider Internal Medicine [phone] Your Primary Care Physician (PCP) Primary Care Physician Office Phone Office Fax Teena Hart [phone] Follow-up Information Follow up With Details Comments Contact Info Ellen Guerra MD Schedule an appointment as soon as possible for a visit in 10 days Call today for follow up within next 3-4 days for warfarin blood level check. [address] [phone] Gayathri Keys MD In 1 day for follow up for recurrent seizures [address] [facility] [address] [phone] [facility] EMERGENCY DEPT  As needed, If symptoms worsen [address] 
[phone] Current Discharge Medication List  
  
START taking these medications Dose & Instructions Dispensing Information Comments Morning Noon Evening Bedtime  
 dilTIAZem  mg ER capsule Commonly known as:  CARDIZEM CD Your next dose is: Today Dose:  120 mg Take 1 Cap by mouth two (2) times a day. Quantity:  60 Cap Refills:  5  
     
   
   
  
   
  
 methylPREDNISolone 4 mg tablet Commonly known as:  MEDROL (HALIMA) Your next dose is:  Tomorrow Take as directed Quantity:  1 Dose Pack Refills:  0 CONTINUE these medications which have CHANGED Dose & Instructions Dispensing Information Comments Morning Noon Evening Bedtime * levETIRAcetam 750 mg tablet Commonly known as:  KEPPRA What changed:  Another medication with the same name was added. Make sure you understand how and when to take each. Your next dose is: Today Dose:  750 mg Take 1 Tab by mouth two (2) times a day. Quantity:  60 Tab Refills:  6  
     
   
   
  
   
  
 * levETIRAcetam 750 mg tablet Commonly known as:  KEPPRA What changed: You were already taking a medication with the same name, and this prescription was added. Make sure you understand how and when to take each. Your next dose is: Today Dose:  750 mg Take 1 Tab by mouth two (2) times a day. Quantity:  60 Tab Refills:  5  
     
   
   
  
   
  
 * losartan 50 mg tablet Commonly known as:  COZAAR What changed:  Another medication with the same name was added. Make sure you understand how and when to take each. Your next dose is:  Tomorrow Dose:  50 mg Take 50 mg by mouth daily. Refills:  0  
     
  
   
   
   
  
 * losartan 50 mg tablet Commonly known as:  COZAAR What changed: You were already taking a medication with the same name, and this prescription was added. Make sure you understand how and when to take each. Your next dose is:  Tomorrow Dose:  50 mg Take 1 Tab by mouth daily. Quantity:  30 Tab Refills:  5  
     
  
   
   
   
  
 warfarin 5 mg tablet Commonly known as:  COUMADIN What changed:   
- medication strength 
- how much to take - when to take this Your next dose is: Today Dose:  5 mg Take 1 Tab by mouth every evening. Quantity:  30 Tab Refills:  5  
     
   
   
  
   
  
 * Notice: This list has 4 medication(s) that are the same as other medications prescribed for you. Read the directions carefully, and ask your doctor or other care provider to review them with you. CONTINUE these medications which have NOT CHANGED Dose & Instructions Dispensing Information Comments Morning Noon Evening Bedtime  
 raNITIdine 150 mg tablet Commonly known as:  ZANTAC Your next dose is: Today Dose:  150 mg Take 150 mg by mouth two (2) times a day. Refills:  0  
     
   
   
  
   
  
 tamsulosin 0.4 mg capsule Commonly known as:  FLOMAX Your next dose is:  Tomorrow Dose:  0.4 mg Take 1 Cap by mouth daily. Quantity:  30 Cap Refills:  0 STOP taking these medications   
 predniSONE 20 mg tablet Commonly known as:  Shade Ok Where to Get Your Medications These medications were sent to [address] [place] [id number] Phone:  [phone]  
  losartan 50 mg tablet Information on where to get these meds will be given to you by the nurse or doctor. ! Ask your nurse or doctor about these medications  
  dilTIAZem  mg ER capsule  
 levETIRAcetam 750 mg tablet  
 methylPREDNISolone 4 mg tablet  
 warfarin 5 mg tablet Discharge Instructions * Follow-up with your Primary Care Doctor. * Take all medications as prescribed. Epilepsy: Care Instructions Your Care Instructions Epilepsy is a common condition that causes repeated seizures. The seizures are caused by bursts of electrical activity in the brain that aren't normal. Seizures may cause problems with muscle control, movement, speech, vision, or awareness. They can be scary. Epilepsy affects each person differently. Some people have only a few seizures. Others get them more often. If you know what triggers a seizure, you may be able to avoid having one. You can take medicines to control and reduce seizures. You and your doctor will need to find the right combination, schedule, and dose of medicine. This may take time and careful changes. Seizures may get worse and happen more often over time. Follow-up care is a key part of your treatment and safety. Be sure to make and go to all appointments, and call your doctor if you are having problems. It's also a good idea to know your test results and keep a list of the medicines you take. How can you care for yourself at home? · Be safe with medicines. Take your medicines exactly as prescribed. Call your doctor if you think you are having a problem with your medicine. · Make a treatment plan with your doctor. Be sure to follow your plan. · Try to identify and avoid things that may make you more likely to have a seizure. These may include: ¨ Not getting enough sleep. ¨ Using drugs or alcohol. ¨ Being emotionally stressed. ¨ Skipping meals. · Keep a record of any seizures you have. Note the date, time of day, and any details about the seizure that you can remember. Your doctor can use this information to plan or adjust your medicine or other treatment. · Be sure that any doctor treating you for another condition knows that you have epilepsy. Each doctor should know what medicines you are taking, if any. · Wear a medical ID bracelet. You can buy this at most [employer].  If you have a seizure that leaves you unconscious or unable to speak for yourself, this bracelet will let those who are treating you know that you have epilepsy. · Talk to your doctor about whether it is safe for you to do certain activities, such as drive or swim. When should you call for help? Call 911 anytime you think you may need emergency care. For example, call if: · A seizure does not stop as it normally does. · You have new symptoms such as: 
¨ Numbness, tingling, or weakness on one side of your body or face. ¨ Vision changes. ¨ Trouble speaking or thinking clearly. Call your doctor now or seek immediate medical care if: 
· You have a fever. · You have a severe headache. Watch closely for changes in your health, and be sure to contact your doctor if: · The normal pattern or features of your seizures change. Where can you learn more? Go to http://mariannFlavourlymic.info/. Ahmet Cat in the search box to learn more about \"Epilepsy: Care Instructions. \" Current as of: February 19, 2016 Content Version: 11.1 © 3567-1971 [employer]. Care instructions adapted under license by [employer] (which disclaims liability or warranty for this information). If you have questions about a medical condition or this instruction, always ask your healthcare professional. [address] any warranty or liability for your use of this information. High INR Test Result: Care Instructions Your Care Instructions You had a blood test to check how long it takes your blood to clot. This test is called a PT or prothrombin time test. The result of the test is called the INR level. A high INR level can happen when you take warfarin (Coumadin). Warfarin helps prevent blood clots. To do this, it slows the amount of time it takes for your blood to clot. This raises your INR level. The INR goal for people who take warfarin is usually from 2 to 3. A value higher than 3.5 increases the risk of bleeding problems. Many things can affect the way warfarin works. Some natural health products and other medicines can make warfarin work too well. That can raise the risk of bleeding. If you drink a lot of alcohol, that may raise your INR. And severe diarrhea or vomiting can also raise your INR. The best way to lower your INR will depend on several things. In some cases, the doctor may have you stop taking warfarin for a few days. You may also be given other medicines to take. You will need to be tested often to make sure your INR level is going down. You will also need to watch for signs of bleeding. The doctor has checked you carefully, but problems can develop later. If you notice any problems or new symptoms, get medical treatment right away. Follow-up care is a key part of your treatment and safety. Be sure to make and go to all appointments, and call your doctor if you are having problems. It's also a good idea to know your test results and keep a list of the medicines you take. How can you care for yourself at home? Be careful with medicines and foods · Don't start or stop taking any medicines, vitamins, or natural remedies unless you first talk to your doctor. · Keep the amount of vitamin K in your diet about the same from day to day. Do not suddenly eat a lot more or a lot less food that is rich in vitamin K than you usually do. Vitamin K affects how warfarin works and how your blood clots. · Avoid cranberry juice and other cranberry products. They can increase the effects of warfarin. · Limit your use of alcohol. Avoid bleeding by preventing falls · Wear slippers or shoes with nonskid soles. · Remove throw rugs and clutter. · Rearrange furniture and electrical cords to keep them out of walking paths. · Keep stairways, porches, and outside walkways well lit. Use night-lights in hallways and bathrooms. · Be extra careful when you work with sharp tools or knives. When should you call for help? Call 911 anytime you think you may need emergency care. For example, call if: 
· You have a sudden, severe headache that is different from past headaches. Call your doctor now or seek immediate medical care if: 
· You have any abnormal bleeding, such as: 
¨ Nosebleeds. ¨ Vaginal bleeding that is different (heavier, more frequent, at a different time of the month) than what you are used to. ¨ Bloody or black stools, or rectal bleeding. ¨ Bloody or pink urine. Watch closely for changes in your health, and be sure to contact your doctor if you have any problems. Where can you learn more? Go to http://mariann-mic.info/. Enter C178 in the search box to learn more about \"High INR Test Result: Care Instructions. \" Current as of: July 28, 2016 Content Version: 11.1 © 5117-7100 [employer]. Care instructions adapted under license by [employer] (which disclaims liability or warranty for this information). If you have questions about a medical condition or this instruction, always ask your healthcare professional. [address] [address] any warranty or liability for your use of this information. Learning About Atrial Fibrillation What is atrial fibrillation? Atrial fibrillation (say \"AY-tree-josh hyp-mxqk-UAK-shun\") is the most common type of irregular heartbeat (arrhythmia). Normally, the heart beats in a strong, steady rhythm. In atrial fibrillation, a problem with the heart's electrical system causes the two upper parts of the heart (the atria) to quiver, or fibrillate. Your heart rate also may be faster than normal. 
Atrial fibrillation can be dangerous because if the heartbeat isn't strong and steady, blood can collect, or pool, in the atria. And pooled blood is more likely to form clots. Clots can travel to the brain, block blood flow, and cause a stroke. Atrial fibrillation can also lead to heart failure. Treatment for atrial fibrillation helps prevent stroke and heart failure. It also helps relieve symptoms. Atrial fibrillation is often caused by another heart problem. It may happen after heart surgery. It may also be caused by other problems, such as an overactive thyroid gland or lung disease. Many people with atrial fibrillation are able to live full and active lives. What are the symptoms? Some people feel symptoms when they have episodes of atrial fibrillation. But other people don't notice any symptoms. If you have symptoms, you may feel: · A fluttering, racing, or pounding feeling in your chest called palpitations. · Weak or tired. · Dizzy or lightheaded. · Short of breath. · Chest pain. · Confused. You may notice signs of atrial fibrillation when you check your pulse. Your pulse may seem uneven or fast. 
What can you expect when you have atrial fibrillation? At first, spells of atrial fibrillation may come on suddenly and last a short time. It may go away on its own or it goes away after treatment. This is called paroxysmal atrial fibrillation. Over time, the spells may last longer and occur more often. They often don't go away on their own. How is it treated? Treatments can help you feel better and prevent future problems, especially stroke and heart failure. The main types of treatment slow the heart rate, control the heart rhythm, and help prevent stroke. Your treatment will depend on the cause of your atrial fibrillation, your symptoms, and your risk for stroke. · Heart rate treatment. Medicine may be used to slow your heart rate. Your heartbeat may still be irregular. But these medicines keep your heart from beating too fast. They may also help relieve your symptoms. · Heart rhythm treatment. Different treatments may be used to try to stop atrial fibrillation and keep it from returning. They can also relieve symptoms.  These treatments include medicine, electrical cardioversion to shock the heart back to a normal rhythm, a procedure called catheter ablation, and heart surgery. · Stroke prevention. You and your doctor can decide how to lower your risk. You may decide to take a blood-thinning medicine, such as aspirin or an anticoagulant. How can you live well with it? You can live well and help manage atrial fibrillation by having a heart-healthy lifestyle. To have a heart-healthy lifestyle: · Don't smoke. · Eat heart-healthy foods. · Be active. Talk to your doctor about what type and level of exercise is safe for you. · Stay at a healthy weight. Lose weight if you need to. · Manage stress. · Avoid alcohol if it triggers symptoms. · Manage other health problems such as high blood pressure, high cholesterol, and diabetes. · Avoid getting sick from the flu. Get a flu shot every year. When should you call for help? Call 911 anytime you think you may need emergency care. For example, call if: 
· You have symptoms of a stroke. These may include: 
¨ Sudden numbness, tingling, weakness, or loss of movement in your face, arm, or leg, especially on only one side of your body. ¨ Sudden vision changes. ¨ Sudden trouble speaking. ¨ Sudden confusion or trouble understanding simple statements. ¨ Sudden problems with walking or balance. ¨ A sudden, severe headache that is different from past headaches. Call your doctor now or seek immediate medical care if: 
· You have new or increased shortness of breath. · You feel dizzy or lightheaded, or you feel like you may faint. Watch closely for changes in your health, and be sure to contact your doctor if you have any problems. Follow-up care is a key part of your treatment and safety. Be sure to make and go to all appointments, and call your doctor if you are having problems. It's also a good idea to know your test results and keep a list of the medicines you take. Where can you learn more? Go to http://mariann-mic.info/. Enter [phone] in the search box to learn more about \"Learning About Atrial Fibrillation. \" Current as of: March 28, 2016 Content Version: 11.1 © 0778-2564 [employer]. Care instructions adapted under license by [employer] (which disclaims liability or warranty for this information). If you have questions about a medical condition or this instruction, always ask your healthcare professional. [place] [address] any warranty or liability for your use of this information. High Blood Pressure: Care Instructions Your Care Instructions If your blood pressure is usually above 140/90, you have high blood pressure, or hypertension. That means the top number is 140 or higher or the bottom number is 90 or higher, or both. Despite what a lot of people think, high blood pressure usually doesn't cause headaches or make you feel dizzy or lightheaded. It usually has no symptoms. But it does increase your risk for heart attack, stroke, and kidney or eye damage. The higher your blood pressure, the more your risk increases. Your doctor will give you a goal for your blood pressure. Your goal will be based on your health and your age. An example of a goal is to keep your blood pressure below 140/90. Lifestyle changes, such as eating healthy and being active, are always important to help lower blood pressure. You might also take medicine to reach your blood pressure goal. 
Follow-up care is a key part of your treatment and safety. Be sure to make and go to all appointments, and call your doctor if you are having problems. It's also a good idea to know your test results and keep a list of the medicines you take. How can you care for yourself at home? Medical treatment · If you stop taking your medicine, your blood pressure will go back up. You may take one or more types of medicine to lower your blood pressure. Be safe with medicines. Take your medicine exactly as prescribed. Call your doctor if you think you are having a problem with your medicine. · Talk to your doctor before you start taking aspirin every day. Aspirin can help certain people lower their risk of a heart attack or stroke. But taking aspirin isn't right for everyone, because it can cause serious bleeding. · See your doctor regularly. You may need to see the doctor more often at first or until your blood pressure comes down. · If you are taking blood pressure medicine, talk to your doctor before you take decongestants or anti-inflammatory medicine, such as ibuprofen. Some of these medicines can raise blood pressure. · Learn how to check your blood pressure at home. Lifestyle changes · Stay at a healthy weight. This is especially important if you put on weight around the waist. Losing even 10 pounds can help you lower your blood pressure. · If your doctor recommends it, get more exercise. Walking is a good choice. Bit by bit, increase the amount you walk every day. Try for at least 30 minutes on most days of the week. You also may want to swim, bike, or do other activities. · Avoid or limit alcohol. Talk to your doctor about whether you can drink any alcohol. · Try to limit how much sodium you eat to less than 2,300 milligrams (mg) a day. Your doctor may ask you to try to eat less than 1,500 mg a day. · Eat plenty of fruits (such as bananas and oranges), vegetables, legumes, whole grains, and low-fat dairy products. · Lower the amount of saturated fat in your diet. Saturated fat is found in animal products such as milk, cheese, and meat. Limiting these foods may help you lose weight and also lower your risk for heart disease. · Do not smoke. Smoking increases your risk for heart attack and stroke. If you need help quitting, talk to your doctor about stop-smoking programs and medicines. These can increase your chances of quitting for good. When should you call for help? Call 911 anytime you think you may need emergency care. This may mean having symptoms that suggest that your blood pressure is causing a serious heart or blood vessel problem. Your blood pressure may be over 180/110. For example, call 911 if: 
· You have symptoms of a heart attack. These may include: ¨ Chest pain or pressure, or a strange feeling in the chest. 
¨ Sweating. ¨ Shortness of breath. ¨ Nausea or vomiting. ¨ Pain, pressure, or a strange feeling in the back, neck, jaw, or upper belly or in one or both shoulders or arms. ¨ Lightheadedness or sudden weakness. ¨ A fast or irregular heartbeat. · You have symptoms of a stroke. These may include: 
¨ Sudden numbness, tingling, weakness, or loss of movement in your face, arm, or leg, especially on only one side of your body. ¨ Sudden vision changes. ¨ Sudden trouble speaking. ¨ Sudden confusion or trouble understanding simple statements. ¨ Sudden problems with walking or balance. ¨ A sudden, severe headache that is different from past headaches. · You have severe back or belly pain. Do not wait until your blood pressure comes down on its own. Get help right away. Call your doctor now or seek immediate care if: 
· Your blood pressure is much higher than normal (such as 180/110 or higher), but you don't have symptoms. · You think high blood pressure is causing symptoms, such as: ¨ Severe headache. ¨ Blurry vision. Watch closely for changes in your health, and be sure to contact your doctor if: 
· Your blood pressure measures 140/90 or higher at least 2 times. That means the top number is 140 or higher or the bottom number is 90 or higher, or both. · You think you may be having side effects from your blood pressure medicine. · Your blood pressure is usually normal, but it goes above normal at least 2 times. Where can you learn more? Go to http://mariann-mic.info/. Enter X610 in the search box to learn more about \"High Blood Pressure: Care Instructions. \" Current as of: August 8, 2016 Content Version: 11.1 © 8948-0549 [employer]. Care instructions adapted under license by [employer] (which disclaims liability or warranty for this information). If you have questions about a medical condition or this instruction, always ask your healthcare professional. [address] any warranty or liability for your use of this information. COPD Exacerbation Plan: Care Instructions Your Care Instructions If you have chronic obstructive pulmonary disease (COPD), your usual shortness of breath could suddenly get worse. You may start coughing more and have more mucus. This flare-up is called a COPD exacerbation (say \"za-PSK-tk-BAY-shun\"). A lung infection or air pollution could set off an exacerbation. Sometimes it can happen after a quick change in temperature or being around chemicals. Work with your doctor to make a plan for dealing with an exacerbation. You can better manage it if you plan ahead. Follow-up care is a key part of your treatment and safety. Be sure to make and go to all appointments, and call your doctor if you are having problems. It's also a good idea to know your test results and keep a list of the medicines you take. How can you care for yourself at home? During an exacerbation · Do not panic if you start to have one. Quick treatment at home may help you prevent serious breathing problems. If you have a COPD exacerbation plan that you developed with your doctor, follow it. · Take your medicines exactly as your doctor tells you. ¨ Use your inhaler as directed by your doctor. If your symptoms do not get better after you use your medicine, have someone take you to the emergency room. Call an ambulance if necessary.  
¨ With inhaled medicines, a spacer or a nebulizer may help you get more medicine to your lungs. Ask your doctor or pharmacist how to use them properly. Practice using the spacer in front of a mirror before you have an exacerbation. This may help you get the medicine into your lungs quickly. ¨ If your doctor has given you steroid pills, take them as directed. ¨ Your doctor may have given you a prescription for antibiotics, which you can fill if you need it. ¨ Talk to your doctor if you have any problems with your medicine. And call your doctor if you have to use your antibiotic or steroid pills. Preventing an exacerbation · Do not smoke. This is the most important step you can take to prevent more damage to your lungs and prevent problems. If you already smoke, it is never too late to stop. If you need help quitting, talk to your doctor about stop-smoking programs and medicines. These can increase your chances of quitting for good. · Take your daily medicines as prescribed. · Avoid colds and flu. ¨ Get a pneumococcal vaccine. ¨ Get a flu vaccine each year, as soon as it is available. Ask those you live or work with to do the same, so they will not get the flu and infect you. ¨ Try to stay away from people with colds or the flu. ¨ Wash your hands often. · Avoid secondhand smoke; air pollution; cold, dry air; hot, humid air; and high altitudes. Stay at home with your windows closed when air pollution is bad. · Learn breathing techniques for COPD, such as breathing through pursed lips. These techniques can help you breathe easier during an exacerbation. When should you call for help? Call 911 anytime you think you may need emergency care. For example, call if: 
· You have severe trouble breathing. · You have severe chest pain. Call your doctor now or seek immediate medical care if: 
· You have new or worse shortness of breath. · You develop new chest pain.  
· You are coughing more deeply or more often, especially if you notice more mucus or a change in the color of your mucus. · You cough up blood. · You have new or increased swelling in your legs or belly. · You have a fever. Watch closely for changes in your health, and be sure to contact your doctor if: 
· You need to use your antibiotic or steroid pills. · Your symptoms are getting worse. Where can you learn more? Go to http://mariann-mic.info/. Enter P241 in the search box to learn more about \"COPD Exacerbation Plan: Care Instructions. \" Current as of: July 21, 2016 Content Version: 11.1 © 5282-1707 [employer]. Care instructions adapted under license by [employer] (which disclaims liability or warranty for this information). If you have questions about a medical condition or this instruction, always ask your healthcare professional. [address] any warranty or liability for your use of this information. Discharge Instructions Attachments/References METHYLPREDNISOLONE (BY MOUTH) (ENGLISH) OXYGEN THERAPY (ENGLISH) DILTIAZEM (BY MOUTH) (ENGLISH) LEVETIRACETAM (BY MOUTH) (ENGLISH) Discharge Orders None Introducing \[facility] [place] [facility]\"" & HEALTH SERVICES! Luz Carrillo introduces [facility] patient portal. Now you can access parts of your medical record, email your doctor's office, and request medication refills online. 1. In your internet browser, go to [email or web address] 2. Click on the First Time User? Click Here link in the Sign In box. You will see the New Member Sign Up page. 3. Enter your [facility] Access Code exactly as it appears below. You will not need to use this code after youve completed the sign-up process. If you do not sign up before the expiration date, you must request a new code. · [facility] Access Code: [email or web address] Expires: 3/16/2017  8:24 AM 
 
4.  Enter the last four digits of your Social Security Number (xxxx) and Date of Birth (mm/dd/yyyy) as indicated and click Submit. You will be taken to the next sign-up page. 5. Create a H[employer] ID. This will be your [employer] login ID and cannot be changed, so think of one that is secure and easy to remember. 6. Create a [employer] password. You can change your password at any time. 7. Enter your Password Reset Question and Answer. This can be used at a later time if you forget your password. 8. Enter your e-mail address. You will receive e-mail notification when new information is available in [address] 9. Click Sign Up. You can now view and download portions of your medical record. 10. Click the Download Summary menu link to download a portable copy of your medical information. If you have questions, please visit the Frequently Asked Questions section of the [employer] website. Remember, [employer] is NOT to be used for urgent needs. For medical emergencies, dial 911. Now available from your iPhone and Android! General Information Please provide this summary of care documentation to your next provider. Patient Signature:  ____________________________________________________________ Date:  ____________________________________________________________  
  
Cass Smith Provider Signature:  ____________________________________________________________ Date:  ____________________________________________________________ More Information Methylprednisolone (By mouth) Methylprednisolone (meth-il-pred-NIS-oh-lone) Treats many diseases and conditions, including problems related to inflammation. This medicine is a corticosteroid. Brand Name(s):Medrol, Medrol Dosepak There may be other brand names for this medicine. When This Medicine Should Not Be Used: This medicine is not right for everyone. Do not use it if you had an allergic reaction to methylprednisolone or if you have a fungus infection. How to Use This Medicine: Tablet · Take your medicine as directed. Your dose may need to be changed several times to find what works best for you. · If you are using this medicine for an ongoing illness, your dose may need to be changed occasionally. Some people take this medicine only every other day, which helps to decrease side effects. · Take your medicine in the morning, unless your doctor tells you otherwise. · It is best to take this medicine with food or milk. · Missed dose: Take a dose as soon as you remember. If it is almost time for your next dose, wait until then and take a regular dose. Do not take extra medicine to make up for a missed dose. · Store the medicine in a closed container at room temperature, away from heat, moisture, and direct light. Drugs and Foods to Avoid: Ask your doctor or pharmacist before using any other medicine, including over-the-counter medicines, vitamins, and herbal products. · Some foods and medicines can affect how methylprednisolone works. Tell your doctor if you use any of the following: ¨ Cyclosporine ¨ Phenobarbital, phenytoin ¨ Rifampin ¨ Ketoconazole ¨ Aspirin, especially high doses ¨ Blood thinner, such as warfarin ¨ Diabetes medicine · This medicine may interfere with vaccines. Ask your doctor before you get a flu shot or any other vaccines. Warnings While Using This Medicine: · Tell your doctor if you are pregnant or breastfeeding, or if you have kidney problems, liver disease, heart failure, high blood pressure, osteoporosis, blood clotting problems, or thyroid problems. Also tell your doctor if you have had mental or emotional problems (such as depression) or stomach or bowel problems (such as an ulcer or diverticulitis). · This medicine may cause the following problems: ¨ Mood or behavior changes ¨ Higher blood pressure, retaining water, changes in salt or potassium levels ¨ Cataracts or glaucoma (with long-term use) ¨ Slow growth in children (with long-term use) · Do not stop using this medicine suddenly. Your doctor will need to slowly decrease your dose before you stop it completely. · This medicine could cause you to get infections more easily. Tell your doctor right away if you have an infection or if you are exposed to chickenpox, measles, or other serious infection. Tell your doctor if you had a serious infection in the past, such as tuberculosis, herpes, or Strongyloides (threadworm). · Tell your doctor about any extra stress or anxiety in your life. Your dose might need to be changed for a short time. · Tell any doctor or dentist who treats you that you are using this medicine. · Keep all medicine out of the reach of children. Never share your medicine with anyone. Possible Side Effects While Using This Medicine:  
Call your doctor right away if you notice any of these side effects: · Allergic reaction: Itching or hives, swelling in your face or hands, swelling or tingling in your mouth or throat, chest tightness, trouble breathing · Dark freckles, skin color changes, coldness, weakness, tiredness, nausea, vomiting, weight loss · Depression, unusual thoughts, feelings, or behaviors, trouble sleeping · Fever, chills, cough, sore throat, and body aches · Severe stomach pain, nausea, vomiting, or red or black stools · Skin changes or growths · Swelling in your hands, ankles, or feet, rapid weight gain · Trouble seeing, eye pain, headache If you notice these less serious side effects, talk with your doctor: · Increased appetite · Round, puffy face · Weight gain around your neck, upper back, breast, face, or waist 
If you notice other side effects that you think are caused by this medicine, tell your doctor. Call your doctor for medical advice about side effects. You may report side effects to FDA at 6-287-FDA-5476 © 2016 [address] is for End User's use only and may not be sold, redistributed or otherwise used for commercial purposes. The above information is an  only. It is not intended as medical advice for individual conditions or treatments. Talk to your doctor, nurse or pharmacist before following any medical regimen to see if it is safe and effective for you. Oxygen Therapy: Care Instructions Your Care Instructions Oxygen therapy helps you get more oxygen into your lungs and bloodstream. You may use it if you have a disease that makes it hard to breathe, such as COPD, pulmonary fibrosis (scarring of the lungs), or heart failure. Oxygen therapy can make it easier for you to breathe and can reduce your heart's workload. Some people need extra oxygen all the time. Others need it from time to time throughout the day or overnight. A doctor will prescribe how much oxygen you need and how often to use it. To breathe the oxygen, most people use a nasal cannula (say \"ABDIEL-yuh-trino\"). This is a thin tube with two prongs that fit just inside your nose. People who need a lot of oxygen may need to use a mask that fits over the nose and mouth. Follow-up care is a key part of your treatment and safety. Be sure to make and go to all appointments, and call your doctor if you are having problems. It's also a good idea to know your test results and keep a list of the medicines you take. How can you care for yourself at home? To help yourself · Using oxygen may dry out your nose or lips. Use water-based lubricants on your lips or nostrils. Do not use an oil-based product like petroleum jelly. · If you use a nasal cannula, the tubing may rub under your nostrils and around your ears. To keep your skin from getting sore, tuck some gauze under the tubing. Use a water-based lotion on rubbed areas. · Do not use alcohol or take drugs that relax you, because they will slow your breathing rate. · Keep track of how much oxygen is in the tank, and reorder before it runs out. If a holiday is coming up or you expect bad weather, order in advance or make your regular order larger. · You may need extra oxygen when you travel to high altitudes or travel by plane. Ask your doctor about this. · If you are getting oxygen directly to your windpipe through an opening in your neck, your doctor will teach you how to care for the equipment. To make sure oxygen is flowing · Check the flow by holding your mask or cannula up to your ear and listening for the \"hiss\" of airflow. · If you have a nasal cannula, dip the prongs in a glass of water. If you see bubbles, oxygen is coming through. · Check your pressure gauge or contents indicator. · If you use an oxygen concentrator, make sure it is turned on and plugged in. If you use a cylinder, make sure the valve is open. · Look for kinks, blockages, or water in the tubing. Be sure the tubing is connected to the oxygen source. · Do not change your oxygen flow rate. Your doctor sets this at the correct level. Higher flow rates usually do not help and can increase the risk of harmful carbon dioxide buildup in the blood. To be safe · Do not leave cords or tubing running across an area where you or someone else may trip on it. · Do not let oxygen containers get hot. Store them in a cool place where there is airflow. Do not leave them in a car trunk or a hot vehicle. · Keep oxygen containers upright. Make sure they do not fall over and get damaged. Try securing the tanks in a sturdy container or securing them with a rope or a chain. · Watch for signs of oxygen leaks. If you hear a loud hissing from your container or if it empties too fast, stay away from the container. Open windows right away and call the company that brought the oxygen system to your home. · Do not use oxygen around anything that could spark or easily cause a fire. ¨ Do not smoke or let others smoke while you are using oxygen. Put up \"no smoking\" signs in your home. ¨ Do not use oxygen near open flames, such as candles, fireplaces, gas stoves, or hot water heaters. Do not use it near electric razors, hair dryers, heating pads, or anything that may spark. ¨ Keep a working fire extinguisher in your home where it is easy to get to. ¨ If a fire starts, turn off the oxygen right away and leave the house. ¨ If you have an oxygen concentrator, do not use it if the cord looks damaged. Do not use an extension cord to plug it in. Do not plug it into an outlet that has other appliances plugged into it. To care for the equipment · Follow the directions that come with the equipment for using and caring for it. · Wash your cannula or mask with a liquid soap and warm water 1 or 2 times a week. Replace them every 2 to 4 weeks. · If you have a cold, change the nasal prongs when your cold symptoms are done. · If you have an oxygen concentrator, unplug the unit and wipe down the cabinet with a damp cloth daily. Clean the air filter at least 2 times a week. Where can you learn more? Go to http://mariann-mic.info/. Enter E117 in the search box to learn more about \"Oxygen Therapy: Care Instructions. \" Current as of: May 23, 2016 Content Version: 11.1 © 3379-2608 [employer]. Care instructions adapted under license by [employer] (which disclaims liability or warranty for this information). If you have questions about a medical condition or this instruction, always ask your healthcare professional. [place] [address] any warranty or liability for your use of this information. Diltiazem (By mouth) Diltiazem (ocl-IIN-v-zem) Treats high blood pressure and angina (chest pain). This medicine is a calcium channel blocker.   
Brand Name(s):Cardizem, Cardizem CD, Cardizem LA, Cardizem SR, Cartia XT, Dilacor XR, Dilt-XR, Matzim LA, Haley Shank, Tiazac There may be other brand names for this medicine. When This Medicine Should Not Be Used: This medicine is not right for everyone. Do not use it if you had an allergic reaction to diltiazem or similar medicines. How to Use This Medicine:  
Long Acting Capsule, 12 Hour Capsule, 24 Hour Capsule, Tablet, Long Acting Tablet · Take your medicine as directed. Your dose may need to be changed several times to find what works best for you. · It is best to take this medicine on an empty stomach. · Swallow this medicine whole. Do not crush, break, chew, or open the capsule or tablet. · Missed dose: Take a dose as soon as you remember. If it is almost time for your next dose, wait until then and take a regular dose. Do not take extra medicine to make up for a missed dose. · Store the medicine in a closed container at room temperature, away from heat, moisture, and direct light. Drugs and Foods to Avoid: Ask your doctor or pharmacist before using any other medicine, including over-the-counter medicines, vitamins, and herbal products. · Some foods and medicines can affect how diltiazem works. Tell your doctor if you are using the following: 
¨ Buspirone, carbamazepine, cimetidine, clonidine, cyclosporine, digoxin, lovastatin, midazolam, quinidine, rifampin, simvastatin, triazolam 
¨ Other blood pressure medicine, including a beta-blocker Dee Brandt Warnings While Using This Medicine: · Tell your doctor if you are pregnant or breastfeeding, or if you have kidney disease, liver disease, or digestion problems. Tell your doctor about all heart problems that you have, including heart failure or rhythm problems. · This medicine could lower your blood pressure too much, especially when you first use it or if you are dehydrated. Stand or sit up slowly if you feel lightheaded or dizzy.  Do not drive or do anything else that could be dangerous until you know how this medicine affects you. · Do not stop using this medicine without asking your doctor, even if you feel well. This medicine will not cure high blood pressure, but it will help keep it in normal range. You may have to take blood pressure medicine for the rest of your life. · Your doctor will do lab tests at regular visits to check on the effects of this medicine. Keep all appointments. · Keep all medicine out of the reach of children. Never share your medicine with anyone. Possible Side Effects While Using This Medicine:  
Call your doctor right away if you notice any of these side effects: · Allergic reaction: Itching or hives, swelling in your face or hands, swelling or tingling in your mouth or throat, chest tightness, trouble breathing · Blistering, peeling, or red skin rash · Fast, slow, uneven, or pounding heartbeat · Rapid weight gain, swelling in your hands, ankles, or feet · Dark urine or pale stools, nausea, vomiting, loss of appetite, stomach pain, yellow skin or eyes If you notice other side effects that you think are caused by this medicine, tell your doctor. Call your doctor for medical advice about side effects. You may report side effects to FDA at 2-918-FDA-7118 © 2016 [address] is for End User's use only and may not be sold, redistributed or otherwise used for commercial purposes. The above information is an  only. It is not intended as medical advice for individual conditions or treatments. Talk to your doctor, nurse or pharmacist before following any medical regimen to see if it is safe and effective for you. Levetiracetam (By mouth) Levetiracetam (fnp-ud-ctf-RA-se-bernard) Treats seizures. Brand Name(s):Keppra, Keppra XR, Spritam  
There may be other brand names for this medicine. When This Medicine Should Not Be Used: This medicine is not right for everyone.  Do not use it if you had an allergic reaction to levetiracetam. 
How to Use This Medicine:  
Liquid, Tablet, Tablet for Suspension, Long Acting Tablet · Take your medicine as directed. Your dose may need to be changed several times to find what works best for you. Take your medicine at the same time each day. · Regular-release or extended-release tablet: Swallow whole. Do not break, crush, or chew it. · Spritam® dissolving tablet:  Make sure your hands are dry before you handle the tablet. Do not open the blister pack until you are ready to take a tablet. Peel back the foil and remove the tablet. Do not push the tablet through the foil. ¨ To dissolve the tablet in your mouth, place the tablet on your tongue and take a sip of water. After the tablet has melted, swallow. Do not swallow the tablet whole. ¨ To dissolve the tablet in liquid so you can drink it, put a small amount of liquid (1 tablespoon or enough to cover the medicine) into a cup and add the tablet. Swirl the liquid gently so the tablet dissolves. After the tablet has dissolved, swallow this mixture right away . Then add a small amount of liquid to the cup again, swirl gently, and swallow this liquid so you get the full amount of medicine. · Measure the oral liquid medicine with a marked measuring spoon, oral syringe, or medicine cup. · This medicine should come with a Medication Guide. Ask your pharmacist for a copy if you do not have one. · Missed dose: Take a dose as soon as you remember. If it is almost time for your next dose, wait until then and take a regular dose. Do not take extra medicine to make up for a missed dose. · Store the medicine in a closed container at room temperature, away from heat, moisture, and direct light. Drugs and Foods to Avoid: Ask your doctor or pharmacist before using any other medicine, including over-the-counter medicines, vitamins, and herbal products. Warnings While Using This Medicine: · Tell your doctor if you are pregnant or breastfeeding, or if you have kidney disease or high blood pressure. · This medicine may cause the following problems: 
¨ Decreased numbers of blood cells, including anemia ¨ Serious skin reactions ¨ High blood pressure · This medicine may cause depression, thoughts of suicide, or changes in mood or behavior. Tell your doctor if you have a history of depression or mental health problems. · This medicine may make you dizzy, drowsy, or clumsy. Do not drive or do anything that could be dangerous until you know how this medicine affects you. · Do not stop using this medicine suddenly. Your doctor will need to slowly decrease your dose before you stop it completely. Your seizures may return or occur more often if you stop using this medicine suddenly. · Tell any doctor or dentist who treats you that you are using this medicine. This medicine may affect certain medical test results. · Your doctor will check your progress and the effects of this medicine at regular visits. Keep all appointments. · Keep all medicine out of the reach of children. Never share your medicine with anyone. Possible Side Effects While Using This Medicine:  
Call your doctor right away if you notice any of these side effects: · Allergic reaction: Itching or hives, swelling in your face or hands, swelling or tingling in your mouth or throat, chest tightness, trouble breathing · Blistering, peeling, red skin rash · Extreme sleepiness, tiredness, or weakness · Fever, chills, cough, sore throat, body aches · Problems with balance, coordination, or walking · Unusual changes in mood or behavior, depression, thoughts of hurting yourself · Unusual bleeding or bruising If you notice these less serious side effects, talk with your doctor: · Decreased appetite, diarrhea, nausea, vomiting · Headache, dizziness · Stuffy or runny nose If you notice other side effects that you think are caused by this medicine, tell your doctor. Call your doctor for medical advice about side effects. You may report side effects to FDA at 5-918-GHQ-4065 © 2016 [address] is for End User's use only and may not be sold, redistributed or otherwise used for commercial purposes. The above information is an  only. It is not intended as medical advice for individual conditions or treatments. Talk to your doctor, nurse or pharmacist before following any medical regimen to see if it is safe and effective for you.

## 2017-02-20 NOTE — IP AVS SNAPSHOT
Current Discharge Medication List  
  
Take these medications at their scheduled times Dose & Instructions Dispensing Information Comments Morning Noon Evening Bedtime  
 dilTIAZem  mg ER capsule Commonly known as:  CARDIZEM CD Your next dose is: Today Dose:  120 mg Take 1 Cap by mouth two (2) times a day. Quantity:  60 Cap Refills:  5  
     
   
   
  
   
  
 * levETIRAcetam 750 mg tablet Commonly known as:  KEPPRA Your next dose is: Today Dose:  750 mg Take 1 Tab by mouth two (2) times a day. Quantity:  60 Tab Refills:  6  
     
   
   
  
   
  
 * levETIRAcetam 750 mg tablet Commonly known as:  KEPPRA Your next dose is: Today Dose:  750 mg Take 1 Tab by mouth two (2) times a day. Quantity:  60 Tab Refills:  5  
     
   
   
  
   
  
 * losartan 50 mg tablet Commonly known as:  COZAAR Your next dose is:  Tomorrow Dose:  50 mg Take 50 mg by mouth daily. Refills:  0  
     
  
   
   
   
  
 * losartan 50 mg tablet Commonly known as:  COZAAR Your next dose is:  Tomorrow Dose:  50 mg Take 1 Tab by mouth daily. Quantity:  30 Tab Refills:  5  
     
  
   
   
   
  
 raNITIdine 150 mg tablet Commonly known as:  ZANTAC Your next dose is: Today Dose:  150 mg Take 150 mg by mouth two (2) times a day. Refills:  0  
     
   
   
  
   
  
 tamsulosin 0.4 mg capsule Commonly known as:  FLOMAX Your next dose is:  Tomorrow Dose:  0.4 mg Take 1 Cap by mouth daily. Quantity:  30 Cap Refills:  0  
     
  
   
   
   
  
 warfarin 5 mg tablet Commonly known as:  COUMADIN Your next dose is: Today Dose:  5 mg Take 1 Tab by mouth every evening. Quantity:  30 Tab Refills:  5  
     
   
   
  
   
  
 * Notice: This list has 4 medication(s) that are the same as other medications prescribed for you.  Read the directions carefully, and ask your doctor or other care provider to review them with you. Take these medications as directed Dose & Instructions Dispensing Information Comments Morning Noon Evening Bedtime  
 methylPREDNISolone 4 mg tablet Commonly known as:  MEDROL (HALIMA) Your next dose is:  Tomorrow Take as directed Quantity:  1 Dose Pack Refills:  0 Where to Get Your Medications These medications were sent to [address] [place], [place] [address] [address] [place] [address], [address] [place] [id number] Phone:  [phone]  
  losartan 50 mg tablet Information about where to get these medications is not yet available ! Ask your nurse or doctor about these medications  
  dilTIAZem  mg ER capsule  
 levETIRAcetam 750 mg tablet  
 methylPREDNISolone 4 mg tablet  
 warfarin 5 mg tablet

## 2017-02-21 LAB
ANION GAP BLD CALC-SCNC: 5 MMOL/L (ref 3–18)
BASOPHILS # BLD AUTO: 0 K/UL (ref 0–0.06)
BASOPHILS # BLD: 0 % (ref 0–2)
BUN SERPL-MCNC: 18 MG/DL (ref 7–18)
BUN/CREAT SERPL: 21 (ref 12–20)
CALCIUM SERPL-MCNC: 8.8 MG/DL (ref 8.5–10.1)
CHLORIDE SERPL-SCNC: 109 MMOL/L (ref 100–108)
CK MB CFR SERPL CALC: 2.9 % (ref 0–4)
CK MB SERPL-MCNC: 3.5 NG/ML (ref 0.5–3.6)
CK SERPL-CCNC: 121 U/L (ref 39–308)
CO2 SERPL-SCNC: 29 MMOL/L (ref 21–32)
CREAT SERPL-MCNC: 0.85 MG/DL (ref 0.6–1.3)
DIFFERENTIAL METHOD BLD: ABNORMAL
EOSINOPHIL # BLD: 0 K/UL (ref 0–0.4)
EOSINOPHIL NFR BLD: 0 % (ref 0–5)
ERYTHROCYTE [DISTWIDTH] IN BLOOD BY AUTOMATED COUNT: 16.7 % (ref 11.6–14.5)
GLUCOSE SERPL-MCNC: 110 MG/DL (ref 74–99)
HCT VFR BLD AUTO: 34.9 % (ref 36–48)
HGB BLD-MCNC: 10.6 G/DL (ref 13–16)
INR PPP: 1.3 (ref 0.8–1.2)
INR PPP: 1.5 (ref 0.8–1.2)
LEVETIRACETAM SERPL-MCNC: NORMAL UG/ML (ref 10–40)
LYMPHOCYTES # BLD AUTO: 6 % (ref 21–52)
LYMPHOCYTES # BLD: 0.3 K/UL (ref 0.9–3.6)
MCH RBC QN AUTO: 26.1 PG (ref 24–34)
MCHC RBC AUTO-ENTMCNC: 30.4 G/DL (ref 31–37)
MCV RBC AUTO: 86 FL (ref 74–97)
MONOCYTES # BLD: 0.2 K/UL (ref 0.05–1.2)
MONOCYTES NFR BLD AUTO: 3 % (ref 3–10)
NEUTS SEG # BLD: 5.3 K/UL (ref 1.8–8)
NEUTS SEG NFR BLD AUTO: 91 % (ref 40–73)
PLATELET # BLD AUTO: 198 K/UL (ref 135–420)
PMV BLD AUTO: 9.6 FL (ref 9.2–11.8)
POTASSIUM SERPL-SCNC: 4.3 MMOL/L (ref 3.5–5.5)
PROTHROMBIN TIME: 15.3 SEC (ref 11.5–15.2)
PROTHROMBIN TIME: 17.7 SEC (ref 11.5–15.2)
RBC # BLD AUTO: 4.06 M/UL (ref 4.7–5.5)
SODIUM SERPL-SCNC: 143 MMOL/L (ref 136–145)
TROPONIN I SERPL-MCNC: 0.04 NG/ML (ref 0–0.04)
WBC # BLD AUTO: 5.8 K/UL (ref 4.6–13.2)

## 2017-02-21 PROCEDURE — 74011250637 HC RX REV CODE- 250/637: Performed by: INTERNAL MEDICINE

## 2017-02-21 PROCEDURE — 80048 BASIC METABOLIC PNL TOTAL CA: CPT | Performed by: PHYSICIAN ASSISTANT

## 2017-02-21 PROCEDURE — 74011000258 HC RX REV CODE- 258: Performed by: HOSPITALIST

## 2017-02-21 PROCEDURE — C8929 TTE W OR WO FOL WCON,DOPPLER: HCPCS

## 2017-02-21 PROCEDURE — 65660000000 HC RM CCU STEPDOWN

## 2017-02-21 PROCEDURE — 94760 N-INVAS EAR/PLS OXIMETRY 1: CPT

## 2017-02-21 PROCEDURE — 94640 AIRWAY INHALATION TREATMENT: CPT

## 2017-02-21 PROCEDURE — 74011250636 HC RX REV CODE- 250/636: Performed by: HOSPITALIST

## 2017-02-21 PROCEDURE — 74011250636 HC RX REV CODE- 250/636: Performed by: PHYSICIAN ASSISTANT

## 2017-02-21 PROCEDURE — 36415 COLL VENOUS BLD VENIPUNCTURE: CPT | Performed by: PHYSICIAN ASSISTANT

## 2017-02-21 PROCEDURE — 74011000250 HC RX REV CODE- 250: Performed by: PHYSICIAN ASSISTANT

## 2017-02-21 PROCEDURE — 74011250637 HC RX REV CODE- 250/637: Performed by: PHYSICIAN ASSISTANT

## 2017-02-21 PROCEDURE — 85610 PROTHROMBIN TIME: CPT | Performed by: PHYSICIAN ASSISTANT

## 2017-02-21 PROCEDURE — 85025 COMPLETE CBC W/AUTO DIFF WBC: CPT | Performed by: PHYSICIAN ASSISTANT

## 2017-02-21 PROCEDURE — 74011250637 HC RX REV CODE- 250/637: Performed by: HOSPITALIST

## 2017-02-21 PROCEDURE — 82550 ASSAY OF CK (CPK): CPT | Performed by: PHYSICIAN ASSISTANT

## 2017-02-21 PROCEDURE — 74011000250 HC RX REV CODE- 250: Performed by: HOSPITALIST

## 2017-02-21 RX ORDER — DILTIAZEM HYDROCHLORIDE 60 MG/1
60 TABLET, FILM COATED ORAL EVERY 6 HOURS
Status: DISCONTINUED | OUTPATIENT
Start: 2017-02-21 | End: 2017-02-22

## 2017-02-21 RX ADMIN — FAMOTIDINE 40 MG: 20 TABLET ORAL at 23:27

## 2017-02-21 RX ADMIN — IPRATROPIUM BROMIDE AND ALBUTEROL SULFATE 3 ML: .5; 3 SOLUTION RESPIRATORY (INHALATION) at 07:35

## 2017-02-21 RX ADMIN — DILTIAZEM HYDROCHLORIDE 60 MG: 60 TABLET, FILM COATED ORAL at 23:27

## 2017-02-21 RX ADMIN — TAMSULOSIN HYDROCHLORIDE 0.4 MG: 0.4 CAPSULE ORAL at 08:27

## 2017-02-21 RX ADMIN — METHYLPREDNISOLONE SODIUM SUCCINATE 40 MG: 40 INJECTION, POWDER, FOR SOLUTION INTRAMUSCULAR; INTRAVENOUS at 00:41

## 2017-02-21 RX ADMIN — LEVOFLOXACIN 500 MG: 5 INJECTION, SOLUTION INTRAVENOUS at 21:09

## 2017-02-21 RX ADMIN — IPRATROPIUM BROMIDE AND ALBUTEROL SULFATE 3 ML: .5; 3 SOLUTION RESPIRATORY (INHALATION) at 04:13

## 2017-02-21 RX ADMIN — IPRATROPIUM BROMIDE AND ALBUTEROL SULFATE 3 ML: .5; 3 SOLUTION RESPIRATORY (INHALATION) at 20:22

## 2017-02-21 RX ADMIN — IPRATROPIUM BROMIDE AND ALBUTEROL SULFATE 3 ML: .5; 3 SOLUTION RESPIRATORY (INHALATION) at 13:31

## 2017-02-21 RX ADMIN — LOSARTAN POTASSIUM 50 MG: 50 TABLET ORAL at 08:27

## 2017-02-21 RX ADMIN — DILTIAZEM HYDROCHLORIDE 60 MG: 60 TABLET, FILM COATED ORAL at 17:53

## 2017-02-21 RX ADMIN — LEVETIRACETAM 750 MG: 250 TABLET, FILM COATED ORAL at 23:27

## 2017-02-21 RX ADMIN — SODIUM CHLORIDE 5 MG/HR: 900 INJECTION, SOLUTION INTRAVENOUS at 23:27

## 2017-02-21 RX ADMIN — WARFARIN SODIUM 6 MG: 5 TABLET ORAL at 17:53

## 2017-02-21 RX ADMIN — METHYLPREDNISOLONE SODIUM SUCCINATE 40 MG: 40 INJECTION, POWDER, FOR SOLUTION INTRAMUSCULAR; INTRAVENOUS at 18:00

## 2017-02-21 RX ADMIN — DILTIAZEM HYDROCHLORIDE 30 MG: 30 TABLET, FILM COATED ORAL at 00:00

## 2017-02-21 RX ADMIN — DILTIAZEM HYDROCHLORIDE 30 MG: 30 TABLET, FILM COATED ORAL at 06:02

## 2017-02-21 RX ADMIN — METHYLPREDNISOLONE SODIUM SUCCINATE 40 MG: 40 INJECTION, POWDER, FOR SOLUTION INTRAMUSCULAR; INTRAVENOUS at 06:02

## 2017-02-21 RX ADMIN — LEVETIRACETAM 750 MG: 250 TABLET, FILM COATED ORAL at 17:50

## 2017-02-21 RX ADMIN — SODIUM CHLORIDE 5 MG/HR: 900 INJECTION, SOLUTION INTRAVENOUS at 09:15

## 2017-02-21 RX ADMIN — METHYLPREDNISOLONE SODIUM SUCCINATE 40 MG: 40 INJECTION, POWDER, FOR SOLUTION INTRAMUSCULAR; INTRAVENOUS at 12:56

## 2017-02-21 RX ADMIN — PERFLUTREN 10 ML: 6.52 INJECTION, SUSPENSION INTRAVENOUS at 12:00

## 2017-02-21 RX ADMIN — FAMOTIDINE 40 MG: 20 TABLET ORAL at 17:50

## 2017-02-21 RX ADMIN — DILTIAZEM HYDROCHLORIDE 60 MG: 60 TABLET, FILM COATED ORAL at 12:55

## 2017-02-21 NOTE — PROGRESS NOTES
conducted an initial consultation and Spiritual Assessment for Kushal Frank, who is a 66 y. o.,male. Patients Primary Language is: [place]. According to the patients EMR Yazidism Affiliation is: No preference. The reason the Patient came to the hospital is:   Patient Active Problem List    Diagnosis Date Noted    COPD with acute exacerbation ([facility] 02/20/2017    History of pulmonary embolism 07/19/2016    [facility]iz[facility] ([facility] 07/19/2016    HTN (hypertension) 07/19/2016    COPD (chronic obstructive pulmonary disease) ([facility] 07/19/2016    Malignant neoplasm of prostate ([facility] 09/30/2010        The  provided the following Interventions:  Initiated a relationship of care and support. Explored issues of kevin, belief, spirituality and Yarsani/ritual needs while hospitalized. Listened empathically. Provided information about Spiritual Care Services. Offered prayer and assurance of continued prayers on patient's behalf. Chart reviewed. The following outcomes were achieved:  Patient shared limited information about both their medical narrative and spiritual journey/beliefs. Patient processed feeling about current hospitalization. Patient expressed gratitude for 's visit. Assessment:  Patient does not have any Yarsani/cultural needs that will affect patients preferences in health care. There are no further spiritual or Yarsani issues which require intervention at this time. Plan:  Chaplains will continue to follow and will provide pastoral care on an as needed/requested basis.  recommends bedside caregivers page  on duty if patient shows signs of acute spiritual or emotional distress. Shamika Vallejo M.Div.   [facility]  [phone]

## 2017-02-21 NOTE — ACP (ADVANCE CARE PLANNING)
Patient has designated _______spouse_________________ to participate in his/her discharge plan and to receive any needed information.      Name: Rajeev vanegas pt  Phone [phone]

## 2017-02-21 NOTE — MANAGEMENT PLAN
[place]   Discharge Planning/ Assessment    Reasons for Intervention:   Interviewed patient. Verified demographics listed on face sheet with patient; all information correct. Patient stated their PCP is Dr Chelsey Carrillo and last appt was last week. States he goes every 10-12 days Patient lives in private residence with spouse. Patient's NOK is wife Trice Kovacs (name is wrong on facesheet). Patient independent with ADLs prior to admission. DME prior to admission: cane and rolling walker, pt states he has just started using o2.  Discharge plan is [address] ONE[address] BSN  Outcomes Manager  Pager # [phone]    High Risk Criteria  [] Yes  [x]No   Physician Referral  [] Yes  [x]No        Date    Nursing Referral  [] Yes  [x]No        Date    Patient/Family Request  [] Yes  [x]No        Date       Resources:    Medicare  [x] Yes  []No   Medicaid  [] Yes  [x]No   No Resources  [] Yes  [x]No   Private Insurance  [] Yes  [x]No    Name/Phone Number    Other  [] Yes  [x]No        (i.e. Workman's Comp)         Prior Services:    Prior Services  [] Yes  [x]No   Home Health  [] Yes  [x]No   [address]  [] Yes  [x]No        Number of [address]  [] Yes  [x]No       Meals on Wheels  [] Yes  [x]No   Office on Aging  [] Yes  [x]No   Transportation Services  [] Yes  [x]No   Nursing Home  [] Yes  [x]No        Nursing Home Name    [address]  [] Yes  [x]No        [address] Name    Other       Information Source:      Information obtained from  [x] Patient  [] Parent   [] [address]  [] Child  [] Spouse   [] Significant Other/Partner   [] Friend      [] EMS    [] Nursing Home Chart          [] Other:   Chart Review  [x] Yes  []No     Family/Support System:    Patient lives with  [] Alone    [x] Spouse   [] Significant Other  [] Children  [] Caretaker   [] Parent  [] Sibling     [] Other       Other Support System:    Is the patient responsible for care of others  [] Yes  [x]No   Information of person caring for patient on  discharge    Managers financial affairs independently  [x] Yes  []No   If no, explain:      Status Prior to Admission:    Mental Status  [x] Awake  [x] Alert  [x] Oriented  [] Quiet/Calm [] Lethargic/Sedated   [] Disoriented  [] Restless/Anxious  [] Combative   Personal Care  [] Dependent  [x] [email or web address]  [] Requires Assistance   Meal Preparation Ability  [x] Independent   [] Standby Assistance   [] Minimal Assistance   [] Moderate Assistance  [] Maximum Assistance     [] Total Assistance   Chores  [x] Independent with Chores   [] [address] Resident   [] Requires Assistance   Bowel/Bladder  [x] Continent  [] Catheter  [] Incontinent  [] Ostomy Self-Care    [] Urine Diversion Self-Care  [] Maximum Assistance     [] Total Assistance   Number of Persons needed for assistance    DME at home  [x] Priya Graciale  [] [address] Straight   [] Commode    [] Bathroom/Grab Bars  [] Hospital Bed  [] Nebulizer  [x] Oxygen           [] Raised Toilet Seat  [] Shower Chair  [] Side Rails for Bed   [] Tub Transfer Bench   [x] Hadley Bame  [] [place] Drones, Standard      [] Other:   Vendor      Treatment Presently Receiving:    Current Treatments  [] Chemotherapy  [] Dialysis  [] Insulin  [] IVAB [x] IVF   [x] O2  [] PCA   [] PT   [] RT   [] Tube Feedings   [] Wound Care     Psychosocial Evaluation:    Verbalized Knowledge of Disease Process  [] Patient  []Family   Coping with Disease Process  [] Patient  []Family   Requires Further Counseling Coping with Disease Process  [] Patient  []Family     Identified Projected Needs:    Home Health Aid  [] Yes  [x]No   Transportation  [] Yes  [x]No   Education  [] Yes  [x]No        Specific Education     Financial Counseling  [] Yes  [x]No   Inability to Care for Self/Will Require 24 hour care  [] Yes  [x]No   Pain Management  [] Yes  [x]No   Home Infusion Therapy  [] Yes  [x]No   Oxygen Therapy  [] Yes [x]No   DME  [] Yes  [x]No   Long Term Care Placement  [] Yes  [x]No   Rehab  [] Yes  [x]No   Physical Therapy  [] Yes  [x]No   Needs Anticipated At This Time  [] Yes  [x]No     Intra-Hospital Referral:    [facility]  [] Yes  [x]No     [] Yes  [x]No   Patient Representative  [] Yes  [x]No   Staff for Teaching Needs  [] Yes  [x]No   Specialty Teaching Needs     Diabetic Educator  [] Yes  [x]No   Referral for Diabetic Educator Needed  [] Yes  [x]No  If Yes, place order for Nutritionist or Diabetic Consult     Tentative Discharge Plan:    Home with No Services  [x] Yes  []No   Home with [address]  [] Yes  [x]No        If Yes, specify type    [address]  [] Yes  [x]No        If Yes, specify type    Meals on Wheels  [] Yes  [x]No   Office of Aging  [] Yes  [x]No   NHP  [] Yes  [x]No   Return to the Nursing Home  [] Yes  [x]No   Rehab Therapy  [] Yes  [x]No   Acute Rehab  [] Yes  [x]No   Subacute Rehab  [] Yes  [x]No   Private Care  [] Yes  [x]No   Substance Abuse Referral  [] Yes  [x]No   Transportation  [] Yes  [x]No   Chore Service  [] Yes  [x]No   Inpatient Hospice  [] Yes  [x]No   OP RT  [] Yes  [x] No   OP Hemo  [] Yes  [x] No   OP PT  [] Yes  [x]No   Support Group  [] Yes  [x]No   Reach to Recovery  [] Yes  [x]No   OP Oncology Clinic  [] Yes  [x]No   Clinic Appointment  [] Yes  [x]No   DME  [] Yes  [x]No   Comments    Name of D/C Planner or  Given to Patient or Family Joshua Garcia RN BSN  Outcomes Manager  Pager # [phone]   Phone Number         Extension    Date 2/21/2017   Time 56   If you are discharged home, whom do you designate to participate in your discharge plan and receive any information needed?      Enter name of designee wife        Phone # of designee         Address of designee         Updated         Patient refused to designate any           individual

## 2017-02-21 NOTE — PROGRESS NOTES
Progress Note      Patient: Giulia Garcia               Sex: male          DOA: 2/20/2017       YOB: 1945      Age:  66 y.o.        LOS:  LOS: 1 day               Subjective:   Pt has a Chads score of 1 he is on coumadin and is not therapeutic . Will give him back his usual dose of 6 mg  of coumadin every night . The pt had a seizure before admission  and he was on keppra 750 mg bid  . unsure of the pt's compliance with his medications . The pt has copd  and is on home o2 . He has a long h/o cigarette smoking and alcohol use . He had a pulmonary embolus in the past and is on coumadin . Pt is on levaqquin  And steroids for his copd exacerbation he is also on po cardizem . The 2 d echo is pending    Objective:      Visit Vitals    /82    Pulse 98    Temp 97.8 °F (36.6 °C)    Resp 18    Ht 6' 2\" (1.88 m)    Wt 83.9 kg (185 lb)    SpO2 91%    BMI 23.75 kg/m2       Physical Exam:  Pt is awake and is alert  Heart irreg irreg   Lungs scattered rhonchi noted   Abdomen soft aqnd nontender   Neuro nonfocal       Lab/Data Reviewed:  CMP:   Lab Results   Component Value Date/Time     02/21/2017 05:11 AM    K 4.3 02/21/2017 05:11 AM     (H) 02/21/2017 05:11 AM    CO2 29 02/21/2017 05:11 AM    AGAP 5 02/21/2017 05:11 AM     (H) 02/21/2017 05:11 AM    BUN 18 02/21/2017 05:11 AM    CREA 0.85 02/21/2017 05:11 AM    GFRAA >60 02/21/2017 05:11 AM    GFRNA >60 02/21/2017 05:11 AM    CA 8.8 02/21/2017 05:11 AM     CBC:   Lab Results   Component Value Date/Time    WBC 5.8 02/21/2017 05:11 AM    HGB 10.6 (L) 02/21/2017 05:11 AM    HCT 34.9 (L) 02/21/2017 05:11 AM     02/21/2017 05:11 AM           Assessment/Plan     Active Problems:    COPD with acute exacerbation ([address] [facility] (2/20/2017)  New onset of atrial fibrillation   H/p pulmonary embolus in the past he was on coumadin for this . Seizure disorder . Pt had a seizure prior to coming to the hospital he is on keppra . Low inr . Pt was given back his coumadin   htn   Pasty h/o alcihol dependence   Plan:2 d echo is pending. continue the steroids and the inhalers for his copd .

## 2017-02-21 NOTE — PROGRESS NOTES
2002 Patient having PVC and heart rate fluctuating from 120 to 140. On call doctor paged x2 with no return call as of 2003. Oncoming nurse made aware.

## 2017-02-21 NOTE — PROGRESS NOTES
0730 Received bedside shift report for patient. Patient sitting up and awake with respiratory therapy receiving breathing treatments. Denies any pain or discomfort. Remains on seizure precautions and neuro checks [id number]. Will continue to monitor. Bed padded and in low position. 0553 Cardizem drip started due to heart rate in the 130-140 range with PVCs. Running at 5mg/hr. Patient resting and denies any pain or discomfort. 1200 IDR completed and no new orders. 1215 Reassessment done and no changes noted    1300 Afternoon medication given. Patient up in bed eating. No c/o pain or discomfort. Both IV lines flushed and patent. Cardizem drip still infusing    1400 Bedside echocardiogram being done.        1900 Bedside shift report given to oncoming nurse by Tania Dia RN

## 2017-02-21 NOTE — PROGRESS NOTES
0730 Cyst/Lump noted to upper left marely, patient stated he has had it for years. Will continue to monitor.  Denies any pain or discomfort

## 2017-02-21 NOTE — PROGRESS NOTES
[facility] - Progress Note  Admit Date: 2/20/2017  Patient seen and examined independently. Agree with transitioning to po Cardizem. Kiara Oreilly MD  Assessment:     - Atrial fibrillation with RVR. New diagnosis. CHADS score: 1 (HTN) at this time. - Seizure disorder with witness seizure prior to admission  - Acute COPD exacerbation. On home O2 for COPD  - Hypertension   - History of pulmonary embolus  - BPH    Plan:     - Adding PO Cardizem today in attempts to wean drip  - INR sub-therapeutic. Will defer dosing to primary team  - Echo today    Subjective:     No new complaints.       Objective:      Patient Vitals for the past 8 hrs:   Temp Pulse Resp BP SpO2   02/21/17 0849 98.5 °F (36.9 °C) (!) 135 18 (!) 168/101 91 %   02/21/17 0737 - - - - 97 %   02/21/17 0634 97.4 °F (36.3 °C) 82 20 150/85 95 %         Patient Vitals for the past 96 hrs:   Weight   02/20/17 0406 185 lb (83.9 kg)                    Intake/Output Summary (Last 24 hours) at 02/21/17 1114  Last data filed at 02/20/17 2248   Gross per 24 hour   Intake              100 ml   Output              400 ml   Net             -300 ml       Physical Exam:  General:  alert, cooperative, no distress  Neck:  nontender, no JVD  Lungs:  clear to auscultation bilaterally  Heart:  irregularly irregular rhythm  Abdomen:  abdomen is soft without significant tenderness, masses, organomegaly or guarding  Extremities:  extremities normal, atraumatic, no cyanosis or edema    Data Review:     Labs: Results:       Chemistry Recent Labs      02/21/17   0511  02/20/17   0400   GLU  110*  110*   NA  143  143   K  4.3  3.7   CL  109*  107   CO2  29  27   BUN  18  24*   CREA  0.85  1.15   CA  8.8  8.6   AGAP  5  9   BUCR  21*  21*   AP   --   56   TP   --   6.9   ALB   --   3.0*   GLOB   --   3.9   AGRAT   --   0.8      CBC w/Diff Recent Labs      02/21/17   0511  02/20/17   0400   WBC  5.8  6.9   RBC  4.06*  4.10*   HGB  10.6*  10.9*   HCT  34.9*  35.4*   PLT 198  218   GRANS  91*  77*   LYMPH  6*  17*   EOS  0  0      Cardiac Enzymes Lab Results   Component Value Date/Time     02/21/2017 05:11 AM    CPK 95 02/20/2017 10:25 PM    CPK 81 02/20/2017 01:31 PM    CKMB 3.5 02/21/2017 05:11 AM    CKMB 3.0 02/20/2017 10:25 PM    CKMB 3.3 02/20/2017 01:31 PM    CKND1 2.9 02/21/2017 05:11 AM    CKND1 3.2 02/20/2017 10:25 PM    CKND1 4.1 (H) 02/20/2017 01:31 PM    TROIQ 0.04 02/21/2017 05:11 AM    TROIQ 0.05 (H) 02/20/2017 10:25 PM    TROIQ 0.09 (H) 02/20/2017 01:31 PM      Coagulation Recent Labs      02/21/17   0511  02/20/17   0400   PTP  17.7*  47.4*   INR  1.5*  5.6*   APTT   --   34.5       Lipid Panel Lab Results   Component Value Date/Time    Cholesterol, total 166 04/29/2011 10:19 AM    HDL Cholesterol 57 04/29/2011 10:19 AM    LDL, calculated 95.6 04/29/2011 10:19 AM    VLDL, calculated 13.4 04/29/2011 10:19 AM    Triglyceride 67 04/29/2011 10:19 AM    CHOL/HDL Ratio 2.9 04/29/2011 10:19 AM      BNP No results found for: BNP, BNPP, XBNPT   Liver Enzymes Recent Labs      02/20/17   0400   TP  6.9   ALB  3.0*   AP  56   SGOT  33      Digoxin    Thyroid Studies Lab Results   Component Value Date/Time    TSH 1.94 08/02/2010 12:29 PM          Signed By: Isai Xie     February 21, 2017

## 2017-02-21 NOTE — PROGRESS NOTES
Problem: Falls - Risk of  Goal: *Absence of falls  Outcome: Progressing Towards Goal  No falls noted this shift. Advised to call for assistance when needing help. Problem: Pain  Goal: *Control of Pain  Outcome: Progressing Towards Goal  No complaints of pain/discomforts. Resting well this shift.

## 2017-02-22 LAB
ALBUMIN SERPL BCP-MCNC: 2.9 G/DL (ref 3.4–5)
ALBUMIN/GLOB SERPL: 0.8 {RATIO} (ref 0.8–1.7)
ALP SERPL-CCNC: 46 U/L (ref 45–117)
ALT SERPL-CCNC: 50 U/L (ref 16–61)
ANION GAP BLD CALC-SCNC: 6 MMOL/L (ref 3–18)
AST SERPL W P-5'-P-CCNC: 31 U/L (ref 15–37)
BASOPHILS # BLD AUTO: 0 K/UL (ref 0–0.06)
BASOPHILS # BLD: 0 % (ref 0–2)
BILIRUB SERPL-MCNC: 0.5 MG/DL (ref 0.2–1)
BUN SERPL-MCNC: 23 MG/DL (ref 7–18)
BUN/CREAT SERPL: 27 (ref 12–20)
CALCIUM SERPL-MCNC: 8.8 MG/DL (ref 8.5–10.1)
CHLORIDE SERPL-SCNC: 107 MMOL/L (ref 100–108)
CO2 SERPL-SCNC: 30 MMOL/L (ref 21–32)
CREAT SERPL-MCNC: 0.84 MG/DL (ref 0.6–1.3)
DIFFERENTIAL METHOD BLD: ABNORMAL
EOSINOPHIL # BLD: 0 K/UL (ref 0–0.4)
EOSINOPHIL NFR BLD: 0 % (ref 0–5)
ERYTHROCYTE [DISTWIDTH] IN BLOOD BY AUTOMATED COUNT: 17 % (ref 11.6–14.5)
GLOBULIN SER CALC-MCNC: 3.6 G/DL (ref 2–4)
GLUCOSE SERPL-MCNC: 124 MG/DL (ref 74–99)
HCT VFR BLD AUTO: 34.2 % (ref 36–48)
HGB BLD-MCNC: 10.4 G/DL (ref 13–16)
INR PPP: 1.3 (ref 0.8–1.2)
LYMPHOCYTES # BLD AUTO: 4 % (ref 21–52)
LYMPHOCYTES # BLD: 0.3 K/UL (ref 0.9–3.6)
MCH RBC QN AUTO: 26.3 PG (ref 24–34)
MCHC RBC AUTO-ENTMCNC: 30.4 G/DL (ref 31–37)
MCV RBC AUTO: 86.6 FL (ref 74–97)
MONOCYTES # BLD: 0.8 K/UL (ref 0.05–1.2)
MONOCYTES NFR BLD AUTO: 10 % (ref 3–10)
NEUTS SEG # BLD: 6.9 K/UL (ref 1.8–8)
NEUTS SEG NFR BLD AUTO: 86 % (ref 40–73)
PLATELET # BLD AUTO: 203 K/UL (ref 135–420)
PMV BLD AUTO: 10 FL (ref 9.2–11.8)
POTASSIUM SERPL-SCNC: 3.9 MMOL/L (ref 3.5–5.5)
PROT SERPL-MCNC: 6.5 G/DL (ref 6.4–8.2)
PROTHROMBIN TIME: 15.8 SEC (ref 11.5–15.2)
RBC # BLD AUTO: 3.95 M/UL (ref 4.7–5.5)
SODIUM SERPL-SCNC: 143 MMOL/L (ref 136–145)
WBC # BLD AUTO: 8 K/UL (ref 4.6–13.2)

## 2017-02-22 PROCEDURE — 74011000250 HC RX REV CODE- 250: Performed by: PHYSICIAN ASSISTANT

## 2017-02-22 PROCEDURE — 65660000000 HC RM CCU STEPDOWN

## 2017-02-22 PROCEDURE — 36415 COLL VENOUS BLD VENIPUNCTURE: CPT | Performed by: HOSPITALIST

## 2017-02-22 PROCEDURE — 94640 AIRWAY INHALATION TREATMENT: CPT

## 2017-02-22 PROCEDURE — 74011250636 HC RX REV CODE- 250/636: Performed by: PHYSICIAN ASSISTANT

## 2017-02-22 PROCEDURE — 74011250637 HC RX REV CODE- 250/637: Performed by: PHYSICIAN ASSISTANT

## 2017-02-22 PROCEDURE — 85025 COMPLETE CBC W/AUTO DIFF WBC: CPT | Performed by: HOSPITALIST

## 2017-02-22 PROCEDURE — 85610 PROTHROMBIN TIME: CPT | Performed by: HOSPITALIST

## 2017-02-22 PROCEDURE — 74011250637 HC RX REV CODE- 250/637: Performed by: HOSPITALIST

## 2017-02-22 PROCEDURE — 80053 COMPREHEN METABOLIC PANEL: CPT | Performed by: HOSPITALIST

## 2017-02-22 PROCEDURE — 74011250636 HC RX REV CODE- 250/636: Performed by: HOSPITALIST

## 2017-02-22 RX ADMIN — IPRATROPIUM BROMIDE AND ALBUTEROL SULFATE 3 ML: .5; 3 SOLUTION RESPIRATORY (INHALATION) at 02:00

## 2017-02-22 RX ADMIN — FAMOTIDINE 40 MG: 20 TABLET ORAL at 17:45

## 2017-02-22 RX ADMIN — FAMOTIDINE 40 MG: 20 TABLET ORAL at 23:10

## 2017-02-22 RX ADMIN — DILTIAZEM HYDROCHLORIDE 60 MG: 60 TABLET, FILM COATED ORAL at 05:59

## 2017-02-22 RX ADMIN — DILTIAZEM HYDROCHLORIDE 90 MG: 60 TABLET, FILM COATED ORAL at 12:04

## 2017-02-22 RX ADMIN — METHYLPREDNISOLONE SODIUM SUCCINATE 20 MG: 40 INJECTION, POWDER, FOR SOLUTION INTRAMUSCULAR; INTRAVENOUS at 21:32

## 2017-02-22 RX ADMIN — IPRATROPIUM BROMIDE AND ALBUTEROL SULFATE 3 ML: .5; 3 SOLUTION RESPIRATORY (INHALATION) at 13:44

## 2017-02-22 RX ADMIN — IPRATROPIUM BROMIDE AND ALBUTEROL SULFATE 3 ML: .5; 3 SOLUTION RESPIRATORY (INHALATION) at 07:29

## 2017-02-22 RX ADMIN — DILTIAZEM HYDROCHLORIDE 90 MG: 60 TABLET, FILM COATED ORAL at 23:10

## 2017-02-22 RX ADMIN — TAMSULOSIN HYDROCHLORIDE 0.4 MG: 0.4 CAPSULE ORAL at 09:09

## 2017-02-22 RX ADMIN — METHYLPREDNISOLONE SODIUM SUCCINATE 40 MG: 40 INJECTION, POWDER, FOR SOLUTION INTRAMUSCULAR; INTRAVENOUS at 09:10

## 2017-02-22 RX ADMIN — IPRATROPIUM BROMIDE AND ALBUTEROL SULFATE 3 ML: .5; 3 SOLUTION RESPIRATORY (INHALATION) at 20:28

## 2017-02-22 RX ADMIN — LOSARTAN POTASSIUM 50 MG: 50 TABLET ORAL at 09:09

## 2017-02-22 RX ADMIN — LEVOFLOXACIN 500 MG: 5 INJECTION, SOLUTION INTRAVENOUS at 21:32

## 2017-02-22 RX ADMIN — LEVETIRACETAM 750 MG: 250 TABLET, FILM COATED ORAL at 17:45

## 2017-02-22 RX ADMIN — DILTIAZEM HYDROCHLORIDE 90 MG: 60 TABLET, FILM COATED ORAL at 17:45

## 2017-02-22 RX ADMIN — LEVETIRACETAM 750 MG: 250 TABLET, FILM COATED ORAL at 23:10

## 2017-02-22 RX ADMIN — WARFARIN SODIUM 6 MG: 5 TABLET ORAL at 17:45

## 2017-02-22 NOTE — PROGRESS NOTES
Progress Note      Patient: Jhon Mccann               Sex: male          DOA: 2/20/2017       YOB: 1945      Age:  66 y.o.        LOS:  LOS: 2 days               Subjective:   Discussed with the pt and his wife . The pt is noncompliant and did not take his keppra for his seizures he is on chronic coumadin for his pulmonary embolus which occurred in 2015 . he has a long h/o cigarette smoking and has an acute exacerbation of his copd . Wili amin has a past h/o alcohol dependence but he stopped this a few years ago . Presently the pt is on po cardizem for rate control. Will decrease the steroid dose . Pt is not wheezing        Objective:      Visit Vitals    /76    Pulse 89    Temp 97.9 °F (36.6 °C)    Resp 20    Ht 6' 2\" (1.88 m)    Wt 86 kg (189 lb 9.6 oz)    SpO2 97%    BMI 24.34 kg/m2       Physical Exam:  Pt is awke and is alert and wants to go home   Heart irreg irreg   Lungs fair breath sounds heard   Abdomen sioft and nontender   Neuro nonfocal      Lab/Data Reviewed:          Assessment/Plan     Active Problems:    COPD with acute exacerbation (HCC) (2/20/2017)  Seizure disorder   New onset of atrial fibrillation with fast ventricular response   htn  bph  Plan:will anticoagulate with lovenox until the inr is therapeutic .

## 2017-02-22 NOTE — PROGRESS NOTES
[facility] - Progress Note  Admit Date: 2/20/2017      I saw, evaluated, interviewed and examined the patient personally. I agree with the findings and plan of care as documented below with JEROME note  A.fib with RVR  HR still suboptimal  Agree with increasing dose of CCB  Anticoagulation with coumadin with goal INR ~2-3    Levy Auguste MD          Assessment:     - Atrial fibrillation with RVR. New diagnosis. CHADS score: 1 (HTN) at this time. - Seizure disorder with witness seizure prior to admission  - Acute COPD exacerbation. On home O2 for COPD  - Hypertension   - History of pulmonary embolus  - BPH     Plan:     - Will increase Cardizem dose, afib >120's   - Warfarin resumed, INR 1.3  - Echo reassuring with preserved EF, no RWMA    Subjective:     No new complaints.      Objective:      Patient Vitals for the past 8 hrs:   Temp Pulse Resp BP SpO2   02/22/17 0920 97.4 °F (36.3 °C) (!) 132 18 131/73 92 %   02/22/17 0755 97.8 °F (36.6 °C) 89 20 143/85 93 %   02/22/17 0732 - - - - 96 %   02/22/17 0558 97.6 °F (36.4 °C) 93 20 120/78 91 %         Patient Vitals for the past 96 hrs:   Weight   02/22/17 0440 189 lb 9.6 oz (86 kg)   02/20/17 0406 185 lb (83.9 kg)                    Intake/Output Summary (Last 24 hours) at 02/22/17 1033  Last data filed at 02/22/17 0600   Gross per 24 hour   Intake           839.34 ml   Output              600 ml   Net           239.34 ml       Physical Exam:  General:  alert, cooperative, no distress  Neck:  nontender, no JVD  Lungs:  Scattered rhonchi  Heart:  irregularly irregular rhythm  Abdomen:  abdomen is soft without significant tenderness, masses, organomegaly or guarding  Extremities:  extremities normal, atraumatic, no cyanosis or edema    Data Review:     Labs: Results:       Chemistry Recent Labs      02/22/17   0440  02/21/17   0511  02/20/17   0400   GLU  124*  110*  110*   NA  143  143  143   K  3.9  4.3  3.7   CL  107  109*  107   CO2  30  29  27   BUN 23*  18  24*   CREA  0.84  0.85  1.15   CA  8.8  8.8  8.6   AGAP  6  5  9   BUCR  27*  21*  21*   AP  46   --   56   TP  6.5   --   6.9   ALB  2.9*   --   3.0*   GLOB  3.6   --   3.9   AGRAT  0.8   --   0.8      CBC w/Diff Recent Labs      02/22/17   0440  02/21/17   0511  02/20/17   0400   WBC  8.0  5.8  6.9   RBC  3.95*  4.06*  4.10*   HGB  10.4*  10.6*  10.9*   HCT  34.2*  34.9*  35.4*   PLT  203  198  218   GRANS  86*  91*  77*   LYMPH  4*  6*  17*   EOS  0  0  0      Cardiac Enzymes No results found for: CPK, CKMMB, CKMB, RCK3, CKMBT, CKNDX, CKND1, OLINDA, TROPT, TROIQ, NATE, TROPT, TNIPOC, BNP, BNPP   Coagulation Recent Labs      02/22/17   0440  02/21/17   1900   02/20/17   0400   PTP  15.8*  15.3*   < >  47.4*   INR  1.3*  1.3*   < >  5.6*   APTT   --    --    --   34.5    < > = values in this interval not displayed. Lipid Panel Lab Results   Component Value Date/Time    Cholesterol, total 166 04/29/2011 10:19 AM    HDL Cholesterol 57 04/29/2011 10:19 AM    LDL, calculated 95.6 04/29/2011 10:19 AM    VLDL, calculated 13.4 04/29/2011 10:19 AM    Triglyceride 67 04/29/2011 10:19 AM    CHOL/HDL Ratio 2.9 04/29/2011 10:19 AM      BNP No results found for: BNP, BNPP, XBNPT   Liver Enzymes Recent Labs      02/22/17   0440   TP  6.5   ALB  2.9*   AP  46   SGOT  31      Digoxin    Thyroid Studies Lab Results   Component Value Date/Time    TSH 1.94 08/02/2010 12:29 PM          Signed By: Tye Huizar.  Milly Oneill     February 22, 2017

## 2017-02-23 LAB
ALBUMIN SERPL BCP-MCNC: 2.9 G/DL (ref 3.4–5)
ALBUMIN/GLOB SERPL: 0.9 {RATIO} (ref 0.8–1.7)
ALP SERPL-CCNC: 46 U/L (ref 45–117)
ALT SERPL-CCNC: 55 U/L (ref 16–61)
ANION GAP BLD CALC-SCNC: 7 MMOL/L (ref 3–18)
AST SERPL W P-5'-P-CCNC: 30 U/L (ref 15–37)
BASOPHILS # BLD AUTO: 0 K/UL (ref 0–0.06)
BASOPHILS # BLD: 0 % (ref 0–2)
BILIRUB SERPL-MCNC: 0.5 MG/DL (ref 0.2–1)
BUN SERPL-MCNC: 29 MG/DL (ref 7–18)
BUN/CREAT SERPL: 30 (ref 12–20)
CALCIUM SERPL-MCNC: 9.1 MG/DL (ref 8.5–10.1)
CHLORIDE SERPL-SCNC: 107 MMOL/L (ref 100–108)
CO2 SERPL-SCNC: 31 MMOL/L (ref 21–32)
CREAT SERPL-MCNC: 0.96 MG/DL (ref 0.6–1.3)
DIFFERENTIAL METHOD BLD: ABNORMAL
EOSINOPHIL # BLD: 0 K/UL (ref 0–0.4)
EOSINOPHIL NFR BLD: 0 % (ref 0–5)
ERYTHROCYTE [DISTWIDTH] IN BLOOD BY AUTOMATED COUNT: 17.2 % (ref 11.6–14.5)
GLOBULIN SER CALC-MCNC: 3.4 G/DL (ref 2–4)
GLUCOSE SERPL-MCNC: 133 MG/DL (ref 74–99)
HCT VFR BLD AUTO: 33.7 % (ref 36–48)
HGB BLD-MCNC: 10.4 G/DL (ref 13–16)
INR PPP: 1.8 (ref 0.8–1.2)
LYMPHOCYTES # BLD AUTO: 7 % (ref 21–52)
LYMPHOCYTES # BLD: 0.7 K/UL (ref 0.9–3.6)
MCH RBC QN AUTO: 26.5 PG (ref 24–34)
MCHC RBC AUTO-ENTMCNC: 30.9 G/DL (ref 31–37)
MCV RBC AUTO: 85.8 FL (ref 74–97)
MONOCYTES # BLD: 0 K/UL (ref 0.05–1.2)
MONOCYTES NFR BLD AUTO: 0 % (ref 3–10)
NEUTS SEG # BLD: 9.3 K/UL (ref 1.8–8)
NEUTS SEG NFR BLD AUTO: 93 % (ref 40–73)
PLATELET # BLD AUTO: 201 K/UL (ref 135–420)
PMV BLD AUTO: 9.9 FL (ref 9.2–11.8)
POTASSIUM SERPL-SCNC: 4.4 MMOL/L (ref 3.5–5.5)
PROT SERPL-MCNC: 6.3 G/DL (ref 6.4–8.2)
PROTHROMBIN TIME: 19.8 SEC (ref 11.5–15.2)
RBC # BLD AUTO: 3.93 M/UL (ref 4.7–5.5)
SODIUM SERPL-SCNC: 145 MMOL/L (ref 136–145)
WBC # BLD AUTO: 10.1 K/UL (ref 4.6–13.2)

## 2017-02-23 PROCEDURE — 74011250636 HC RX REV CODE- 250/636: Performed by: HOSPITALIST

## 2017-02-23 PROCEDURE — 80053 COMPREHEN METABOLIC PANEL: CPT | Performed by: HOSPITALIST

## 2017-02-23 PROCEDURE — 74011250636 HC RX REV CODE- 250/636: Performed by: PHYSICIAN ASSISTANT

## 2017-02-23 PROCEDURE — 36415 COLL VENOUS BLD VENIPUNCTURE: CPT | Performed by: HOSPITALIST

## 2017-02-23 PROCEDURE — 94640 AIRWAY INHALATION TREATMENT: CPT

## 2017-02-23 PROCEDURE — 74011000250 HC RX REV CODE- 250: Performed by: PHYSICIAN ASSISTANT

## 2017-02-23 PROCEDURE — 65660000000 HC RM CCU STEPDOWN

## 2017-02-23 PROCEDURE — 74011250637 HC RX REV CODE- 250/637: Performed by: HOSPITALIST

## 2017-02-23 PROCEDURE — 85025 COMPLETE CBC W/AUTO DIFF WBC: CPT | Performed by: HOSPITALIST

## 2017-02-23 PROCEDURE — 74011250637 HC RX REV CODE- 250/637: Performed by: PHYSICIAN ASSISTANT

## 2017-02-23 PROCEDURE — 85610 PROTHROMBIN TIME: CPT | Performed by: HOSPITALIST

## 2017-02-23 RX ORDER — DILTIAZEM HYDROCHLORIDE 120 MG/1
120 CAPSULE, COATED, EXTENDED RELEASE ORAL 2 TIMES DAILY
Status: DISCONTINUED | OUTPATIENT
Start: 2017-02-23 | End: 2017-02-24 | Stop reason: HOSPADM

## 2017-02-23 RX ORDER — PREDNISONE 20 MG/1
20 TABLET ORAL
Status: DISCONTINUED | OUTPATIENT
Start: 2017-02-24 | End: 2017-02-24 | Stop reason: HOSPADM

## 2017-02-23 RX ADMIN — IPRATROPIUM BROMIDE AND ALBUTEROL SULFATE 3 ML: .5; 3 SOLUTION RESPIRATORY (INHALATION) at 14:07

## 2017-02-23 RX ADMIN — FAMOTIDINE 40 MG: 20 TABLET ORAL at 22:13

## 2017-02-23 RX ADMIN — TAMSULOSIN HYDROCHLORIDE 0.4 MG: 0.4 CAPSULE ORAL at 10:49

## 2017-02-23 RX ADMIN — FAMOTIDINE 40 MG: 20 TABLET ORAL at 17:57

## 2017-02-23 RX ADMIN — NIFEDIPINE 120 MG: 10 CAPSULE ORAL at 17:58

## 2017-02-23 RX ADMIN — LEVOFLOXACIN 500 MG: 5 INJECTION, SOLUTION INTRAVENOUS at 22:12

## 2017-02-23 RX ADMIN — NIFEDIPINE 120 MG: 10 CAPSULE ORAL at 14:11

## 2017-02-23 RX ADMIN — LOSARTAN POTASSIUM 50 MG: 50 TABLET ORAL at 10:49

## 2017-02-23 RX ADMIN — IPRATROPIUM BROMIDE AND ALBUTEROL SULFATE 3 ML: .5; 3 SOLUTION RESPIRATORY (INHALATION) at 01:38

## 2017-02-23 RX ADMIN — LEVETIRACETAM 750 MG: 250 TABLET, FILM COATED ORAL at 17:57

## 2017-02-23 RX ADMIN — IPRATROPIUM BROMIDE AND ALBUTEROL SULFATE 3 ML: .5; 3 SOLUTION RESPIRATORY (INHALATION) at 21:30

## 2017-02-23 RX ADMIN — LEVETIRACETAM 750 MG: 250 TABLET, FILM COATED ORAL at 22:13

## 2017-02-23 RX ADMIN — DILTIAZEM HYDROCHLORIDE 90 MG: 60 TABLET, FILM COATED ORAL at 05:46

## 2017-02-23 RX ADMIN — METHYLPREDNISOLONE SODIUM SUCCINATE 20 MG: 40 INJECTION, POWDER, FOR SOLUTION INTRAMUSCULAR; INTRAVENOUS at 10:49

## 2017-02-23 RX ADMIN — WARFARIN SODIUM 6 MG: 5 TABLET ORAL at 17:57

## 2017-02-23 RX ADMIN — IPRATROPIUM BROMIDE AND ALBUTEROL SULFATE 3 ML: .5; 3 SOLUTION RESPIRATORY (INHALATION) at 07:50

## 2017-02-23 NOTE — PROGRESS NOTES
Progress Note      Patient: Christianne Barnett               Sex: male          DOA: 2/20/2017       YOB: 1945      Age:  66 y.o.        LOS:  LOS: 3 days               Subjective:   Pt admits that he occasionally took his keppra for his seizure disorder his inr is 1.8 and he is on 6 mg coumadin which was his home dose. The pt  will be switched to po prednisone in the am.   he is on home o2    The pt has now been switched to cardizem po bid as per cardiology       Objective:      Visit Vitals    /81 (BP 1 Location: Left arm, BP Patient Position: At rest)    Pulse (!) 103    Temp 97.4 °F (36.3 °C)    Resp 20    Ht 6' 2\" (1.88 m)    Wt 82.7 kg (182 lb 6.4 oz)    SpO2 98%    BMI 23.42 kg/m2       Physical Exam:  Pt is awake and alert . Heart irreg irreg   Lungs rhonchi noted bilaterally . Abdomen soft and no masses felt   Neuro nonfocal        Lab/Data Reviewed:  CMP:   Lab Results   Component Value Date/Time     02/23/2017 04:00 AM    K 4.4 02/23/2017 04:00 AM     02/23/2017 04:00 AM    CO2 31 02/23/2017 04:00 AM    AGAP 7 02/23/2017 04:00 AM     (H) 02/23/2017 04:00 AM    BUN 29 (H) 02/23/2017 04:00 AM    CREA 0.96 02/23/2017 04:00 AM    GFRAA >60 02/23/2017 04:00 AM    GFRNA >60 02/23/2017 04:00 AM    CA 9.1 02/23/2017 04:00 AM    ALB 2.9 (L) 02/23/2017 04:00 AM    TP 6.3 (L) 02/23/2017 04:00 AM    GLOB 3.4 02/23/2017 04:00 AM    AGRAT 0.9 02/23/2017 04:00 AM    SGOT 30 02/23/2017 04:00 AM    ALT 55 02/23/2017 04:00 AM     CBC:   Lab Results   Component Value Date/Time    WBC 10.1 02/23/2017 04:00 AM    HGB 10.4 (L) 02/23/2017 04:00 AM    HCT 33.7 (L) 02/23/2017 04:00 AM     02/23/2017 04:00 AM           Assessment/Plan     Active Problems:    COPD with acute exacerbation ([address] (2/20/20.  Pt is improving   Atrial fibrillation with rvr the patient is now on coumadin and po cardizem   Seizure disorder prednisone is on keppra   Noncompliance     Plan:will put the pt on po prednisone .  If his inr is therapeutic will be able to dc the pt in the am

## 2017-02-23 NOTE — PROGRESS NOTES
[facility] - Progress Note  Admit Date: 2/20/2017  Patient seen and examined independently. Still requiring some adjustment in rate control. Patient improving symptomatically. Should be able to be [facility] tomorrow. Agree with assessment and plan as noted below. Zoya Mayo MD  Assessment:     - Atrial fibrillation with RVR. New diagnosis. CHADS score: 1 (HTN) at this time. - Seizure disorder with witness seizure prior to admission  - Acute COPD exacerbation. On home O2 for COPD  - Hypertension   - History of pulmonary embolus  - BPH     Plan:     - Will change to long acting Cardizem twice daily, still rapid afib with activity  - INR 1.8 today, Warfarin per primary team     Subjective:     No new complaints.      Objective:      Patient Vitals for the past 8 hrs:   Temp Pulse Resp BP SpO2   02/23/17 0750 - - - - 96 %   02/23/17 0627 97.6 °F (36.4 °C) 92 20 124/75 100 %   02/23/17 0151 97.7 °F (36.5 °C) 67 20 131/81 91 %         Patient Vitals for the past 96 hrs:   Weight   02/23/17 0151 182 lb 6.4 oz (82.7 kg)   02/22/17 0440 189 lb 9.6 oz (86 kg)   02/20/17 0406 185 lb (83.9 kg)                    Intake/Output Summary (Last 24 hours) at 02/23/17 0939  Last data filed at 02/23/17 0503   Gross per 24 hour   Intake              460 ml   Output              800 ml   Net             -340 ml       Physical Exam:  General:  alert, cooperative, no distress  Neck:  nontender, no JVD  Lungs:  Wheezing thoughout  Heart:  irregularly irregular rhythm  Abdomen:  abdomen is soft without significant tenderness, masses, organomegaly or guarding  Extremities:  extremities normal, atraumatic, no cyanosis or edema    Data Review:     Labs: Results:       Chemistry Recent Labs      02/23/17   0400  02/22/17   0440  02/21/17   0511   GLU  133*  124*  110*   NA  145  143  143   K  4.4  3.9  4.3   CL  107  107  109*   CO2  31  30  29   BUN  29*  23*  18   CREA  0.96  0.84  0.85   CA  9.1  8.8  8.8   AGAP  7  6  5   BUCR 30*  27*  21*   AP  46  46   --    TP  6.3*  6.5   --    ALB  2.9*  2.9*   --    GLOB  3.4  3.6   --    AGRAT  0.9  0.8   --       CBC w/Diff Recent Labs      02/23/17   0400  02/22/17   0440  02/21/17   0511   WBC  10.1  8.0  5.8   RBC  3.93*  3.95*  4.06*   HGB  10.4*  10.4*  10.6*   HCT  33.7*  34.2*  34.9*   PLT  201  203  198   GRANS  93*  86*  91*   LYMPH  7*  4*  6*   EOS  0  0  0      Cardiac Enzymes No results found for: CPK, CKMMB, CKMB, RCK3, CKMBT, CKNDX, CKND1, OLINDA, TROPT, TROIQ, NATE, TROPT, TNIPOC, BNP, BNPP   Coagulation Recent Labs      02/23/17   0400  02/22/17   0440   PTP  19.8*  15.8*   INR  1.8*  1.3*       Lipid Panel Lab Results   Component Value Date/Time    Cholesterol, total 166 04/29/2011 10:19 AM    HDL Cholesterol 57 04/29/2011 10:19 AM    LDL, calculated 95.6 04/29/2011 10:19 AM    VLDL, calculated 13.4 04/29/2011 10:19 AM    Triglyceride 67 04/29/2011 10:19 AM    CHOL/HDL Ratio 2.9 04/29/2011 10:19 AM      BNP No results found for: BNP, BNPP, XBNPT   Liver Enzymes Recent Labs      02/23/17   0400   TP  6.3*   ALB  2.9*   AP  46   SGOT  30      Digoxin    Thyroid Studies Lab Results   Component Value Date/Time    TSH 1.94 08/02/2010 12:29 PM          Signed By: Sourav Ortez     February 23, 2017

## 2017-02-23 NOTE — ROUTINE PROCESS
1910 Bedside and Verbal shift change report given to Irish Leung (oncoming nurse) by Rebel Doherty RN   (offgoing nurse). Report included the following information Kardex, MAR and Recent Results.

## 2017-02-23 NOTE — MANAGEMENT PLAN
[facility]   Discharge Planning/ Assessment    Reasons for Intervention:   Interviewed patient. Verified demographics listed on face sheet with patient; all information correct. Patient stated their PCP is NP Lissett Fregoso and states it has been quite awhile since last visit. Educated on good PCP care to avoid hospitalization and maintain health. Pt will call and make follow up appt . Patient lives alone . Patient's NOK is Kavita atkins at [phone]. Patient independent with ADLs prior to admission. Family takes pt to errands and appts. DME prior to admission: rolling walker, cane.  Discharge plan is Home with [address] RN BSN  Outcomes Manager  Pager # [phone]    High Risk Criteria  [x] Yes  []No   Physician Referral  [] Yes  [x]No        Date    Nursing Referral  [] Yes  [x]No        Date    Patient/Family Request  [] Yes  [x]No        Date       Resources:    Medicare  [x] Yes  []No   Medicaid  [] Yes  [x]No   No Resources  [] Yes  [x]No   Private Insurance  [] Yes  [x]No    Name/Phone Number    Other  [] Yes  [x]No        (i.e. Workman's Comp)         Prior Services:    Prior Services  [] Yes  [x]No   Home Health  [] Yes  [x]No   [address] [place]  [] Yes  [x]No        Number of [address]  [] Yes  [x]No       Meals on Wheels  [] Yes  [x]No   Office on Aging  [] Yes  [x]No   Transportation Services  [] Yes  [x]No   Nursing Home  [] Yes  [x]No        Nursing Home Name    [address]  [] Yes  [x]No        [address] Name    Other       Information Source:      Information obtained from  [x] Patient  [] Parent   [] [address]  [] Child  [] Spouse   [] Significant Other/Partner   [] Friend      [] EMS    [] Nursing Home Chart          [] Other:   Chart Review  [x] Yes  []No     Family/Support System:    Patient lives with  [x] Alone    [] Spouse   [] Significant Other  [] Children  [] Caretaker   [] Parent [] Sibling     [] Other       Other Support System:    Is the patient responsible for care of others  [] Yes  [x]No   Information of person caring for patient on  discharge    Managers financial affairs independently  [x] Yes  []No   If no, explain:      Status Prior to Admission:    Mental Status  [x] Awake  [x] Alert  [x] Oriented  [] Quiet/Calm [] Lethargic/Sedated   [] Disoriented  [] Restless/Anxious  [] Combative   Personal Care  [] Dependent  [x] [address]  [] Requires Assistance   Meal Preparation Ability  [x] Independent   [] Standby Assistance   [] Minimal Assistance   [] Moderate Assistance  [] Maximum Assistance     [] Total Assistance   Chores  [x] Independent with Chores   [] [address] [facility] Resident   [] Requires Assistance   Bowel/Bladder  [x] Continent  [] Catheter  [] Incontinent  [] Ostomy Self-Care    [] Urine Diversion Self-Care  [] Maximum Assistance     [] Total Assistance   Number of Persons needed for assistance    DME at home  [x] Olman Curtis  [] Amrita Curtis   [] Commode    [] Bathroom/Grab Bars  [] Hospital Bed  [] Nebulizer  [] Oxygen           [] Raised Toilet Seat  [] Shower Chair  [] Side Rails for Bed   [] Tub Transfer Bench   [x] Mihir Hall  [] [address] [place]      [] Other:   Vendor      Treatment Presently Receiving:    Current Treatments  [] Chemotherapy  [] Dialysis  [] Insulin  [] IVAB [x] IVF   [] O2  [] PCA   [] PT   [] RT   [] Tube Feedings   [] Wound Care     Psychosocial Evaluation:    Verbalized Knowledge of Disease Process  [] Patient  []Family   Coping with Disease Process  [] Patient  []Family   Requires Further Counseling Coping with Disease Process  [] Patient  []Family     Identified Projected Needs:    Home Health   [x] Yes  []No   Transportation  [] Yes  [x]No   Education  [] Yes  [x]No        Specific Education     Financial Counseling  [] Yes  [x]No   Inability to Care for Self/Will Require 24 hour care  [] Yes  [x]No   Pain Management [] Yes  [x]No   Home Infusion Therapy  [] Yes  [x]No   Oxygen Therapy  [] Yes  [x]No   DME  [] Yes  [x]No   Long Term Care Placement  [] Yes  [x]No   Rehab  [] Yes  [x]No   Physical Therapy  [] Yes  [x]No   Needs Anticipated At This Time  [] Yes  [x]No     Intra-Hospital Referral:    [address] [facility]  [] Yes  [x]No     [] Yes  [x]No   Patient Representative  [] Yes  [x]No   Staff for Teaching Needs  [] Yes  [x]No   Specialty Teaching Needs     Diabetic Educator  [] Yes  [x]No   Referral for Diabetic Educator Needed  [] Yes  [x]No  If Yes, place order for Nutritionist or Diabetic Consult     Tentative Discharge Plan:    Home with No Services  [] Yes  [x]No   Home with Home Health Follow-up  [x] Yes  []No        If Yes, specify type    [address]  [] Yes  [x]No        If Yes, specify type    Meals on Wheels  [] Yes  [x]No   Office of Aging  [] Yes  [x]No   NHP  [] Yes  [x]No   Return to the Nursing Home  [] Yes  [x]No   Rehab Therapy  [] Yes  [x]No   Acute Rehab  [] Yes  [x]No   Subacute Rehab  [] Yes  [x]No   Private Care  [] Yes  [x]No   Substance Abuse Referral  [] Yes  [x]No   Transportation  [] Yes  [x]No   Chore Service  [] Yes  [x]No   Inpatient Hospice  [] Yes  [x]No   OP RT  [] Yes  [x] No   OP Hemo  [] Yes  [x] No   OP PT  [] Yes  [x]No   Support Group  [] Yes  [x]No   Reach to Recovery  [] Yes  [x]No   OP Oncology Clinic  [] Yes  [x]No   Clinic Appointment  [] Yes  [x]No   DME  [] Yes  [x]No   Comments    Name of D/C Planner or  Given to Patient or Family Jose Angel Reese RN BSN  Outcomes Manager  Pager # [phone]   Phone Number         Extension    Date 2/23/2017   Time [phone]   If you are discharged home, whom do you designate to participate in your discharge plan and receive any information needed?      Enter name of designee nidee        Phone # of designee         Address of designee         Updated         Patient refused to designate any           individual

## 2017-02-24 ENCOUNTER — HOME HEALTH ADMISSION (OUTPATIENT)
Dept: HOME HEALTH SERVICES | Facility: HOME HEALTH | Age: 72
End: 2017-02-24

## 2017-02-24 VITALS
OXYGEN SATURATION: 91 % | RESPIRATION RATE: 16 BRPM | TEMPERATURE: 97.9 F | WEIGHT: 182.4 LBS | HEIGHT: 74 IN | HEART RATE: 100 BPM | BODY MASS INDEX: 23.41 KG/M2 | DIASTOLIC BLOOD PRESSURE: 84 MMHG | SYSTOLIC BLOOD PRESSURE: 149 MMHG

## 2017-02-24 LAB
ALBUMIN SERPL BCP-MCNC: 2.7 G/DL (ref 3.4–5)
ALBUMIN/GLOB SERPL: 0.8 {RATIO} (ref 0.8–1.7)
ALP SERPL-CCNC: 56 U/L (ref 45–117)
ALT SERPL-CCNC: 87 U/L (ref 16–61)
ANION GAP BLD CALC-SCNC: 2 MMOL/L (ref 3–18)
AST SERPL W P-5'-P-CCNC: 42 U/L (ref 15–37)
BILIRUB SERPL-MCNC: 0.3 MG/DL (ref 0.2–1)
BUN SERPL-MCNC: 32 MG/DL (ref 7–18)
BUN/CREAT SERPL: 32 (ref 12–20)
CALCIUM SERPL-MCNC: 8.6 MG/DL (ref 8.5–10.1)
CHLORIDE SERPL-SCNC: 108 MMOL/L (ref 100–108)
CO2 SERPL-SCNC: 35 MMOL/L (ref 21–32)
CREAT SERPL-MCNC: 0.99 MG/DL (ref 0.6–1.3)
ERYTHROCYTE [DISTWIDTH] IN BLOOD BY AUTOMATED COUNT: 17.4 % (ref 11.6–14.5)
GLOBULIN SER CALC-MCNC: 3.2 G/DL (ref 2–4)
GLUCOSE SERPL-MCNC: 111 MG/DL (ref 74–99)
HCT VFR BLD AUTO: 33.8 % (ref 36–48)
HGB BLD-MCNC: 10.2 G/DL (ref 13–16)
INR PPP: 2.7 (ref 0.8–1.2)
MCH RBC QN AUTO: 26.2 PG (ref 24–34)
MCHC RBC AUTO-ENTMCNC: 30.2 G/DL (ref 31–37)
MCV RBC AUTO: 86.9 FL (ref 74–97)
PLATELET # BLD AUTO: 187 K/UL (ref 135–420)
PMV BLD AUTO: 10 FL (ref 9.2–11.8)
POTASSIUM SERPL-SCNC: 4.6 MMOL/L (ref 3.5–5.5)
PROT SERPL-MCNC: 5.9 G/DL (ref 6.4–8.2)
PROTHROMBIN TIME: 27.3 SEC (ref 11.5–15.2)
RBC # BLD AUTO: 3.89 M/UL (ref 4.7–5.5)
SODIUM SERPL-SCNC: 145 MMOL/L (ref 136–145)
WBC # BLD AUTO: 9.7 K/UL (ref 4.6–13.2)

## 2017-02-24 PROCEDURE — 36415 COLL VENOUS BLD VENIPUNCTURE: CPT | Performed by: PHYSICIAN ASSISTANT

## 2017-02-24 PROCEDURE — 74011250637 HC RX REV CODE- 250/637: Performed by: PHYSICIAN ASSISTANT

## 2017-02-24 PROCEDURE — 94640 AIRWAY INHALATION TREATMENT: CPT

## 2017-02-24 PROCEDURE — 85027 COMPLETE CBC AUTOMATED: CPT | Performed by: PHYSICIAN ASSISTANT

## 2017-02-24 PROCEDURE — 74011000250 HC RX REV CODE- 250: Performed by: PHYSICIAN ASSISTANT

## 2017-02-24 PROCEDURE — 74011250637 HC RX REV CODE- 250/637: Performed by: HOSPITALIST

## 2017-02-24 PROCEDURE — 80053 COMPREHEN METABOLIC PANEL: CPT | Performed by: PHYSICIAN ASSISTANT

## 2017-02-24 PROCEDURE — 85610 PROTHROMBIN TIME: CPT | Performed by: PHYSICIAN ASSISTANT

## 2017-02-24 PROCEDURE — 74011636637 HC RX REV CODE- 636/637: Performed by: HOSPITALIST

## 2017-02-24 RX ORDER — WARFARIN SODIUM 5 MG/1
5 TABLET ORAL EVERY EVENING
Qty: 30 TAB | Refills: 5 | Status: SHIPPED | OUTPATIENT
Start: 2017-02-24

## 2017-02-24 RX ORDER — METHYLPREDNISOLONE 4 MG/1
TABLET ORAL
Qty: 1 DOSE PACK | Refills: 0 | Status: SHIPPED | OUTPATIENT
Start: 2017-02-24

## 2017-02-24 RX ORDER — LOSARTAN POTASSIUM 50 MG/1
50 TABLET ORAL DAILY
Qty: 30 TAB | Refills: 5 | Status: SHIPPED | OUTPATIENT
Start: 2017-02-24

## 2017-02-24 RX ORDER — LEVOFLOXACIN 500 MG/1
500 TABLET, FILM COATED ORAL EVERY 24 HOURS
Status: DISCONTINUED | OUTPATIENT
Start: 2017-02-24 | End: 2017-02-24 | Stop reason: HOSPADM

## 2017-02-24 RX ORDER — WARFARIN SODIUM 5 MG/1
5 TABLET ORAL EVERY EVENING
Status: DISCONTINUED | OUTPATIENT
Start: 2017-02-24 | End: 2017-02-24 | Stop reason: HOSPADM

## 2017-02-24 RX ORDER — LEVETIRACETAM 750 MG/1
750 TABLET ORAL 2 TIMES DAILY
Qty: 60 TAB | Refills: 5 | Status: SHIPPED | OUTPATIENT
Start: 2017-02-24

## 2017-02-24 RX ORDER — DILTIAZEM HYDROCHLORIDE 120 MG/1
120 CAPSULE, COATED, EXTENDED RELEASE ORAL 2 TIMES DAILY
Qty: 60 CAP | Refills: 5 | Status: SHIPPED | OUTPATIENT
Start: 2017-02-24

## 2017-02-24 RX ADMIN — FAMOTIDINE 40 MG: 20 TABLET ORAL at 18:03

## 2017-02-24 RX ADMIN — LEVOFLOXACIN 500 MG: 500 TABLET, FILM COATED ORAL at 18:03

## 2017-02-24 RX ADMIN — WARFARIN SODIUM 5 MG: 5 TABLET ORAL at 18:03

## 2017-02-24 RX ADMIN — LEVETIRACETAM 750 MG: 250 TABLET, FILM COATED ORAL at 18:02

## 2017-02-24 RX ADMIN — IPRATROPIUM BROMIDE AND ALBUTEROL SULFATE 3 ML: .5; 3 SOLUTION RESPIRATORY (INHALATION) at 04:22

## 2017-02-24 RX ADMIN — IPRATROPIUM BROMIDE AND ALBUTEROL SULFATE 3 ML: .5; 3 SOLUTION RESPIRATORY (INHALATION) at 09:50

## 2017-02-24 RX ADMIN — PREDNISONE 20 MG: 20 TABLET ORAL at 09:25

## 2017-02-24 RX ADMIN — TAMSULOSIN HYDROCHLORIDE 0.4 MG: 0.4 CAPSULE ORAL at 09:24

## 2017-02-24 RX ADMIN — LOSARTAN POTASSIUM 50 MG: 50 TABLET ORAL at 09:25

## 2017-02-24 RX ADMIN — NIFEDIPINE 120 MG: 10 CAPSULE ORAL at 18:03

## 2017-02-24 RX ADMIN — IPRATROPIUM BROMIDE AND ALBUTEROL SULFATE 3 ML: .5; 3 SOLUTION RESPIRATORY (INHALATION) at 14:10

## 2017-02-24 RX ADMIN — NIFEDIPINE 120 MG: 10 CAPSULE ORAL at 09:22

## 2017-02-24 NOTE — CDMP QUERY
Please clarify if this patient has been  treated/managed for:    =>Acute on chronic respiratory failure    =>Other Explanation of clinical findings  =>Unable to Determine (no explanation of clinical findings)    The medical record reflects the following clinical findings, treatment, and risk factors:   =>70 yo male with PMH COPD, seizures, HTN, prostrate cancer  => Per H&P 'Acute on chronic Hypoxic respiratory failure on Home O2'  =>ABG pH 7.294/57/71 on 3 L/m nasal cannula in ED  =>02 therapy, albuterol nebs, ABG, levaquin      Please clarify and document your clinical opinion in the progress notes and discharge summary including the definitive and/or presumptive diagnosis, (suspected or probable), related to the above clinical findings. Please include clinical findings supporting your diagnosis. If you DECLINE this query or would like to communicate with [facility], please utilize the \"[facility] message box\" at the TOP of the Progress Note on the right.       Thank you,    Cielo Lin RN  [facility] [phone]

## 2017-02-24 NOTE — PROGRESS NOTES
[facility] - Progress Note  Admit Date: 2/20/2017      I saw, evaluated, interviewed and examined the patient personally. I agree with the findings and plan of care as documented below with JEROME note  A.fib and rate controlled today with current CCB twice a day dosing  Coumadin   Continue current care  No further cardiac work up planned at this time unless clinical status changes. Call us back if needed. Will be available as needed. Will need to follow up in cardiology clinic in 2-3 weeks after discharge. Thank you. Linda Pendleton MD      Assessment:     - Atrial fibrillation with RVR. New diagnosis. CHADS score: 1 (HTN) at this time. - Seizure disorder with witness seizure prior to admission  - Acute COPD exacerbation. On home O2 for COPD  - Hypertension   - History of pulmonary embolus  - BPH    Plan:     - Reviewed telemetry. Afib with improved rates trending ~100's with occasional spikes with activity. Continue long acting Cardizem twice daily. BP stable. - INR therapeutic 2.7  - Will need 2-3 week cardiology follow up at discharge. Subjective:     No new complaints.  Feels better    Objective:      Patient Vitals for the past 8 hrs:   Temp Pulse Resp BP SpO2   02/24/17 0607 97.6 °F (36.4 °C) 95 16 118/76 98 %   02/24/17 0152 98.1 °F (36.7 °C) 87 16 117/72 100 %         Patient Vitals for the past 96 hrs:   Weight   02/23/17 0151 182 lb 6.4 oz (82.7 kg)   02/22/17 0440 189 lb 9.6 oz (86 kg)                    Intake/Output Summary (Last 24 hours) at 02/24/17 0983  Last data filed at 02/24/17 4129   Gross per 24 hour   Intake              220 ml   Output              475 ml   Net             -255 ml       Physical Exam:  General:  alert, cooperative, no distress  Neck:  nontender, no JVD  Lungs:  clear to auscultation bilaterally  Heart:  Irregularly irregular  Abdomen:  abdomen is soft without significant tenderness, masses, organomegaly or guarding  Extremities:  extremities normal, atraumatic, no cyanosis or edema    Data Review:     Labs: Results:       Chemistry Recent Labs      02/24/17   0430  02/23/17   0400  02/22/17   0440   GLU  111*  133*  124*   NA  145  145  143   K  4.6  4.4  3.9   CL  108  107  107   CO2  35*  31  30   BUN  32*  29*  23*   CREA  0.99  0.96  0.84   CA  8.6  9.1  8.8   AGAP  2*  7  6   BUCR  32*  30*  27*   AP  56  46  46   TP  5.9*  6.3*  6.5   ALB  2.7*  2.9*  2.9*   GLOB  3.2  3.4  3.6   AGRAT  0.8  0.9  0.8      CBC w/Diff Recent Labs      02/24/17 0430 02/23/17   0400  02/22/17   0440   WBC  9.7  10.1  8.0   RBC  3.89*  3.93*  3.95*   HGB  10.2*  10.4*  10.4*   HCT  33.8*  33.7*  34.2*   PLT  187  201  203   GRANS   --   93*  86*   LYMPH   --   7*  4*   EOS   --   0  0      Cardiac Enzymes No results found for: CPK, CKMMB, CKMB, RCK3, CKMBT, CKNDX, CKND1, OLINDA, TROPT, TROIQ, NATE, TROPT, TNIPOC, BNP, BNPP   Coagulation Recent Labs      02/24/17 0430  02/23/17   0400   PTP  27.3*  19.8*   INR  2.7*  1.8*       Lipid Panel Lab Results   Component Value Date/Time    Cholesterol, total 166 04/29/2011 10:19 AM    HDL Cholesterol 57 04/29/2011 10:19 AM    LDL, calculated 95.6 04/29/2011 10:19 AM    VLDL, calculated 13.4 04/29/2011 10:19 AM    Triglyceride 67 04/29/2011 10:19 AM    CHOL/HDL Ratio 2.9 04/29/2011 10:19 AM      BNP No results found for: BNP, BNPP, XBNPT   Liver Enzymes Recent Labs      02/24/17   0430   TP  5.9*   ALB  2.7*   AP  56   SGOT  42*      Digoxin    Thyroid Studies Lab Results   Component Value Date/Time    TSH 1.94 08/02/2010 12:29 PM          Signed By: Mary Flores.  Honor Victorina     February 24, 2017

## 2017-02-24 NOTE — PROGRESS NOTES
Pt's wife is  agreeable for a [facility] visit  She verified the contact information on the face sheet is correct. Referral sent to intake via [facility] and called to the [facility] as a pending DC.    1550  Notified [facility] via the [facility] the pt is dc'd.

## 2017-02-24 NOTE — ROUTINE PROCESS
2000: Assumed care. Assessment done. Denies any pain or discomfort at this time. Call light within reach. Side rails up x 3. Padded. 2113: No change from previous assessment. 2213: Due meds given. PIV line infiltrated. Put gauge #22 in the left hand. 2330: Quietly sleeping. 7581: No change from previous assessment. 0300: Quietly sleeping.  0606: No change from previous assessment. Slept good thru night. Needs attended. 2420: Bedside and Verbal shift change report given to Peter Hand RN (oncoming nurse) by Porfirio Aldridge RN (offgoing nurse). Report included the following information SBAR, Kardex, Intake/Output, MAR and Recent Results.

## 2017-02-24 NOTE — PROGRESS NOTES
Pt states he occasionally gets SOB with exertion. Denies palpitations. Still a-fib on tele with HR 130s-150s.    ~ 1140 - HR in 90s right now, per tele. ~ 1525 - Verbal shift change report given to Martha Fofana RN (oncoming nurse) by Marsha Isidro RN (offgoing nurse). Report included the following information SBAR, Kardex, Intake/Output, MAR and Recent Results. Pt handed off in stable condition.

## 2017-02-24 NOTE — PROGRESS NOTES
[facility]: This patient meets P & T approved criteria for conversion from IV to oral therapy for the following medication:     Levofloxacin 500 mg IV q24h was discontinued and Levofloxacin 500 mg PO q24h was ordered. If the patient no longer meets all criteria for oral therapy, the pharmacist will switch back to IV therapy. Thanks.

## 2017-02-24 NOTE — PROGRESS NOTES
conducted a Follow up consultation and Spiritual Assessment for Melissa Ruffin, who is a 66 y. o.,male. The  provided the following Interventions:  Continued the relationship of care and support. Listened empathically. Offered prayer and assurance of continued prayer on patients behalf. Chart reviewed. The following outcomes were achieved:  Patient expressed gratitude for pastoral care visit. Assessment:  There are no further spiritual or [employer] issues which require Spiritual Care Services interventions at this time. Plan:  Chaplains will continue to follow and will provide pastoral care on an as needed/requested basis.  recommends bedside caregivers page  on duty if patient shows signs of acute spiritual or emotional distress.      [address] [facility]   Staff [address] [facility]   [phone]

## 2017-02-24 NOTE — DISCHARGE SUMMARY
Discharge Summary    Patient: Jayson Ruano               Sex: male          DOA: 2/20/2017         YOB: 1945      Age:  66 y.o.        LOS:  LOS: 4 days                Admit Date: 2/20/2017    Discharge Date: 2/24/2017    Admission Diagnoses: COPD with acute exacerbation [facility])    Discharge Diagnoses:    Problem List as of 2/24/2017  Date Reviewed: 9/22/2015          Codes Class Noted - Resolved    COPD with acute exacerbation ([facility] ICD-10-CM: J44.1  ICD-9-CM: 491.21  2/20/2017 - Present        History of pulmonary embolism (Chronic) ICD-10-CM: Z86.711  ICD-9-CM: V12.55  7/19/2016 - Present        Seizure ([facility] ICD-10-CM: R56.9  ICD-9-CM: 780.39  7/19/2016 - Present        HTN (hypertension) (Chronic) ICD-10-CM: I10  ICD-9-CM: 401.9  7/19/2016 - Present        COPD (chronic obstructive pulmonary disease) ([facility] (Chronic) ICD-10-CM: J44.9  ICD-9-CM: 496  7/19/2016 - Present        Malignant neoplasm of prostate ([facility] (Chronic) ICD-10-CM: C61  ICD-9-CM: 185  9/30/2010 - Present    Overview Signed 6/22/2015  9:56 AM by Danisha Cueto LPN     Overview:   Betty 4+5. RESOLVED: Hemorrhoids with complication YNU-34-CI: H23.9  ICD-9-CM: 455.8  6/22/2015 - 7/19/2016        RESOLVED: ED (erectile dysfunction) ICD-10-CM: N52.9  ICD-9-CM: 607.84  8/6/2014 - 7/19/2016              Discharge Condition:  Improved    Hospital Course:pt is a 66year old male who has a long h/o cigarette smking and alcohol dependence and who said that he has stopped using both he is admitted for a new onset of atrial fibrillation with fast ventricular response he also had a seizure which was witnessed and occurred before admission . he also had an exacerbation of his copd on admission as well . The pt lives with his wife he has a h/o pulmonary embolus in the past and is on chronic coumadin.  The pt is noncompliant and has not taken his keppra for his seizures in some time he does not get his in=r checked and his inr on admission was 5.6 and he was given vitamin K to reverse the inr . He was admitted and was given duoneb by n and was put on levaquin for  his bronchitis . His inr was brought back up to a therapeutic level theophylline pt was also put on iv solumedrol for his exacerbation of his copd and this was slowly tapered and switched to a medrol pack on dc . The pt did not have any more seizures and compliance with medication was discussed with the pt and his wife   The pt will be followed by cardiology his 2 d echo showed an ef of 55-60 % with the wall thickness  Moderately to markedly increased with septal prominence . Pt's blood pressure control was discussed also he was put on long acting cardizem 120 mg po bid for rate control and control of blood pressure    Consults:    Treatment Team: Attending Provider: Chavez Dodson MD; Physician Assistant: LIZETH Kenyon; Utilization Review: Bill Lucas RN; Care Manager: Darion Hardy RN; Care Manager: Verito Tristan RN    Significant Diagnostic Studies:     Discharge Medications:     Current Discharge Medication List      START taking these medications    Details   dilTIAZem CD (CARDIZEM CD) 120 mg ER capsule Take 1 Cap by mouth two (2) times a day. Qty: 60 Cap, Refills: 5      methylPREDNISolone (MEDROL, HALIMA,) 4 mg tablet Take as directed  Qty: 1 Dose Pack, Refills: 0         CONTINUE these medications which have CHANGED    Details   !! levETIRAcetam (KEPPRA) 750 mg tablet Take 1 Tab by mouth two (2) times a day. Qty: 60 Tab, Refills: 5      warfarin (COUMADIN) 5 mg tablet Take 1 Tab by mouth every evening. Qty: 30 Tab, Refills: 5      !! losartan (COZAAR) 50 mg tablet Take 1 Tab by mouth daily. Qty: 30 Tab, Refills: 5       !! - Potential duplicate medications found. Please discuss with provider. CONTINUE these medications which have NOT CHANGED    Details   !! losartan (COZAAR) 50 mg tablet Take 50 mg by mouth daily.       raNITIdine (ZANTAC) 150 mg tablet Take 150 mg by mouth two (2) times a day. !! levETIRAcetam (KEPPRA) 750 mg tablet Take 1 Tab by mouth two (2) times a day. Qty: 60 Tab, Refills: 6      tamsulosin (FLOMAX) 0.4 mg capsule Take 1 Cap by mouth daily. Qty: 30 Cap, Refills: 0       !! - Potential duplicate medications found. Please discuss with provider.       STOP taking these medications       predniSONE (DELTASONE) 20 mg tablet Comments:   Reason for Stopping:               Activity: as tolerated     Diet regular    Wound Care:none     Follow-up: with pcp

## 2017-02-24 NOTE — DISCHARGE INSTRUCTIONS
* Follow-up with your Primary Care Doctor. * Take all medications as prescribed. Epilepsy: Care Instructions  Your Care Instructions  Epilepsy is a common condition that causes repeated seizures. The seizures are caused by bursts of electrical activity in the brain that aren't normal. Seizures may cause problems with muscle control, movement, speech, vision, or awareness. They can be scary. Epilepsy affects each person differently. Some people have only a few seizures. Others get them more often. If you know what triggers a seizure, you may be able to avoid having one. You can take medicines to control and reduce seizures. You and your doctor will need to find the right combination, schedule, and dose of medicine. This may take time and careful changes. Seizures may get worse and happen more often over time. Follow-up care is a key part of your treatment and safety. Be sure to make and go to all appointments, and call your doctor if you are having problems. It's also a good idea to know your test results and keep a list of the medicines you take. How can you care for yourself at home? · Be safe with medicines. Take your medicines exactly as prescribed. Call your doctor if you think you are having a problem with your medicine. · Make a treatment plan with your doctor. Be sure to follow your plan. · Try to identify and avoid things that may make you more likely to have a seizure. These may include:  ¨ Not getting enough sleep. ¨ Using drugs or alcohol. ¨ Being emotionally stressed. ¨ Skipping meals. · Keep a record of any seizures you have. Note the date, time of day, and any details about the seizure that you can remember. Your doctor can use this information to plan or adjust your medicine or other treatment. · Be sure that any doctor treating you for another condition knows that you have epilepsy. Each doctor should know what medicines you are taking, if any. · Wear a medical ID bracelet.  You can buy this at most [employer]. If you have a seizure that leaves you unconscious or unable to speak for yourself, this bracelet will let those who are treating you know that you have epilepsy. · Talk to your doctor about whether it is safe for you to do certain activities, such as drive or swim. When should you call for help? Call 911 anytime you think you may need emergency care. For example, call if:  · A seizure does not stop as it normally does. · You have new symptoms such as:  ¨ Numbness, tingling, or weakness on one side of your body or face. ¨ Vision changes. ¨ Trouble speaking or thinking clearly. Call your doctor now or seek immediate medical care if:  · You have a fever. · You have a severe headache. Watch closely for changes in your health, and be sure to contact your doctor if:  · The normal pattern or features of your seizures change. Where can you learn more? Go to http://mariann-mic.info/. Maurice Gallegos in the search box to learn more about \"Epilepsy: Care Instructions. \"  Current as of: February 19, 2016  Content Version: 11.1  © 8920-9927 [employer]. Care instructions adapted under license by [employer] (which disclaims liability or warranty for this information). If you have questions about a medical condition or this instruction, always ask your healthcare professional. [address] any warranty or liability for your use of this information. High INR Test Result: Care Instructions  Your Care Instructions  You had a blood test to check how long it takes your blood to clot. This test is called a PT or prothrombin time test. The result of the test is called the INR level. A high INR level can happen when you take warfarin (Coumadin). Warfarin helps prevent blood clots. To do this, it slows the amount of time it takes for your blood to clot. This raises your INR level.  The INR goal for people who take warfarin is usually from 2 to 3. A value higher than 3.5 increases the risk of bleeding problems. Many things can affect the way warfarin works. Some natural health products and other medicines can make warfarin work too well. That can raise the risk of bleeding. If you drink a lot of alcohol, that may raise your INR. And severe diarrhea or vomiting can also raise your INR. The best way to lower your INR will depend on several things. In some cases, the doctor may have you stop taking warfarin for a few days. You may also be given other medicines to take. You will need to be tested often to make sure your INR level is going down. You will also need to watch for signs of bleeding. The doctor has checked you carefully, but problems can develop later. If you notice any problems or new symptoms, get medical treatment right away. Follow-up care is a key part of your treatment and safety. Be sure to make and go to all appointments, and call your doctor if you are having problems. It's also a good idea to know your test results and keep a list of the medicines you take. How can you care for yourself at home? Be careful with medicines and foods  · Don't start or stop taking any medicines, vitamins, or natural remedies unless you first talk to your doctor. · Keep the amount of vitamin K in your diet about the same from day to day. Do not suddenly eat a lot more or a lot less food that is rich in vitamin K than you usually do. Vitamin K affects how warfarin works and how your blood clots. · Avoid cranberry juice and other cranberry products. They can increase the effects of warfarin. · Limit your use of alcohol. Avoid bleeding by preventing falls  · Wear slippers or shoes with nonskid soles. · Remove throw rugs and clutter. · Rearrange furniture and electrical cords to keep them out of walking paths. · Keep stairways, porches, and outside walkways well lit. Use night-lights in hallways and bathrooms.   · Be extra careful when you work with sharp tools or knives. When should you call for help? Call 911 anytime you think you may need emergency care. For example, call if:  · You have a sudden, severe headache that is different from past headaches. Call your doctor now or seek immediate medical care if:  · You have any abnormal bleeding, such as:  ¨ Nosebleeds. ¨ Vaginal bleeding that is different (heavier, more frequent, at a different time of the month) than what you are used to. ¨ Bloody or black stools, or rectal bleeding. ¨ Bloody or pink urine. Watch closely for changes in your health, and be sure to contact your doctor if you have any problems. Where can you learn more? Go to http://mariann-mic.info/. Enter C178 in the search box to learn more about \"High INR Test Result: Care Instructions. \"  Current as of: July 28, 2016  Content Version: 11.1  © 1762-1112 [employer]. Care instructions adapted under license by [employer] (which disclaims liability or warranty for this information). If you have questions about a medical condition or this instruction, always ask your healthcare professional. [place] [address] any warranty or liability for your use of this information. Learning About Atrial Fibrillation  What is atrial fibrillation? Atrial fibrillation (say \"AY-tree-josh tzx-ogdw-BHL-shun\") is the most common type of irregular heartbeat (arrhythmia). Normally, the heart beats in a strong, steady rhythm. In atrial fibrillation, a problem with the heart's electrical system causes the two upper parts of the heart (the atria) to quiver, or fibrillate. Your heart rate also may be faster than normal.  Atrial fibrillation can be dangerous because if the heartbeat isn't strong and steady, blood can collect, or pool, in the atria. And pooled blood is more likely to form clots. Clots can travel to the brain, block blood flow, and cause a stroke.  Atrial fibrillation can also lead to heart failure. Treatment for atrial fibrillation helps prevent stroke and heart failure. It also helps relieve symptoms. Atrial fibrillation is often caused by another heart problem. It may happen after heart surgery. It may also be caused by other problems, such as an overactive thyroid gland or lung disease. Many people with atrial fibrillation are able to live full and active lives. What are the symptoms? Some people feel symptoms when they have episodes of atrial fibrillation. But other people don't notice any symptoms. If you have symptoms, you may feel:  · A fluttering, racing, or pounding feeling in your chest called palpitations. · Weak or tired. · Dizzy or lightheaded. · Short of breath. · Chest pain. · Confused. You may notice signs of atrial fibrillation when you check your pulse. Your pulse may seem uneven or fast.  What can you expect when you have atrial fibrillation? At first, spells of atrial fibrillation may come on suddenly and last a short time. It may go away on its own or it goes away after treatment. This is called paroxysmal atrial fibrillation. Over time, the spells may last longer and occur more often. They often don't go away on their own. How is it treated? Treatments can help you feel better and prevent future problems, especially stroke and heart failure. The main types of treatment slow the heart rate, control the heart rhythm, and help prevent stroke. Your treatment will depend on the cause of your atrial fibrillation, your symptoms, and your risk for stroke. · Heart rate treatment. Medicine may be used to slow your heart rate. Your heartbeat may still be irregular. But these medicines keep your heart from beating too fast. They may also help relieve your symptoms. · Heart rhythm treatment. Different treatments may be used to try to stop atrial fibrillation and keep it from returning. They can also relieve symptoms.  These treatments include medicine, electrical cardioversion to shock the heart back to a normal rhythm, a procedure called catheter ablation, and heart surgery. · Stroke prevention. You and your doctor can decide how to lower your risk. You may decide to take a blood-thinning medicine, such as aspirin or an anticoagulant. How can you live well with it? You can live well and help manage atrial fibrillation by having a heart-healthy lifestyle. To have a heart-healthy lifestyle:  · Don't smoke. · Eat heart-healthy foods. · Be active. Talk to your doctor about what type and level of exercise is safe for you. · Stay at a healthy weight. Lose weight if you need to. · Manage stress. · Avoid alcohol if it triggers symptoms. · Manage other health problems such as high blood pressure, high cholesterol, and diabetes. · Avoid getting sick from the flu. Get a flu shot every year. When should you call for help? Call 911 anytime you think you may need emergency care. For example, call if:  · You have symptoms of a stroke. These may include:  ¨ Sudden numbness, tingling, weakness, or loss of movement in your face, arm, or leg, especially on only one side of your body. ¨ Sudden vision changes. ¨ Sudden trouble speaking. ¨ Sudden confusion or trouble understanding simple statements. ¨ Sudden problems with walking or balance. ¨ A sudden, severe headache that is different from past headaches. Call your doctor now or seek immediate medical care if:  · You have new or increased shortness of breath. · You feel dizzy or lightheaded, or you feel like you may faint. Watch closely for changes in your health, and be sure to contact your doctor if you have any problems. Follow-up care is a key part of your treatment and safety. Be sure to make and go to all appointments, and call your doctor if you are having problems. It's also a good idea to know your test results and keep a list of the medicines you take. Where can you learn more?   Go to http://mariann-mic.info/. Enter [phone] in the search box to learn more about \"Learning About Atrial Fibrillation. \"  Current as of: March 28, 2016  Content Version: 11.1  © 3953-6707 [employer]. Care instructions adapted under license by [employer] (which disclaims liability or warranty for this information). If you have questions about a medical condition or this instruction, always ask your healthcare professional. [address] any warranty or liability for your use of this information. High Blood Pressure: Care Instructions  Your Care Instructions  If your blood pressure is usually above 140/90, you have high blood pressure, or hypertension. That means the top number is 140 or higher or the bottom number is 90 or higher, or both. Despite what a lot of people think, high blood pressure usually doesn't cause headaches or make you feel dizzy or lightheaded. It usually has no symptoms. But it does increase your risk for heart attack, stroke, and kidney or eye damage. The higher your blood pressure, the more your risk increases. Your doctor will give you a goal for your blood pressure. Your goal will be based on your health and your age. An example of a goal is to keep your blood pressure below 140/90. Lifestyle changes, such as eating healthy and being active, are always important to help lower blood pressure. You might also take medicine to reach your blood pressure goal.  Follow-up care is a key part of your treatment and safety. Be sure to make and go to all appointments, and call your doctor if you are having problems. It's also a good idea to know your test results and keep a list of the medicines you take. How can you care for yourself at home? Medical treatment  · If you stop taking your medicine, your blood pressure will go back up. You may take one or more types of medicine to lower your blood pressure. Be safe with medicines.  Take your medicine exactly as prescribed. Call your doctor if you think you are having a problem with your medicine. · Talk to your doctor before you start taking aspirin every day. Aspirin can help certain people lower their risk of a heart attack or stroke. But taking aspirin isn't right for everyone, because it can cause serious bleeding. · See your doctor regularly. You may need to see the doctor more often at first or until your blood pressure comes down. · If you are taking blood pressure medicine, talk to your doctor before you take decongestants or anti-inflammatory medicine, such as ibuprofen. Some of these medicines can raise blood pressure. · Learn how to check your blood pressure at home. Lifestyle changes  · Stay at a healthy weight. This is especially important if you put on weight around the waist. Losing even 10 pounds can help you lower your blood pressure. · If your doctor recommends it, get more exercise. Walking is a good choice. Bit by bit, increase the amount you walk every day. Try for at least 30 minutes on most days of the week. You also may want to swim, bike, or do other activities. · Avoid or limit alcohol. Talk to your doctor about whether you can drink any alcohol. · Try to limit how much sodium you eat to less than 2,300 milligrams (mg) a day. Your doctor may ask you to try to eat less than 1,500 mg a day. · Eat plenty of fruits (such as bananas and oranges), vegetables, legumes, whole grains, and low-fat dairy products. · Lower the amount of saturated fat in your diet. Saturated fat is found in animal products such as milk, cheese, and meat. Limiting these foods may help you lose weight and also lower your risk for heart disease. · Do not smoke. Smoking increases your risk for heart attack and stroke. If you need help quitting, talk to your doctor about stop-smoking programs and medicines. These can increase your chances of quitting for good.   When should you call for help?  Call 911 anytime you think you may need emergency care. This may mean having symptoms that suggest that your blood pressure is causing a serious heart or blood vessel problem. Your blood pressure may be over 180/110. For example, call 911 if:  · You have symptoms of a heart attack. These may include:  ¨ Chest pain or pressure, or a strange feeling in the chest.  ¨ Sweating. ¨ Shortness of breath. ¨ Nausea or vomiting. ¨ Pain, pressure, or a strange feeling in the back, neck, jaw, or upper belly or in one or both shoulders or arms. ¨ Lightheadedness or sudden weakness. ¨ A fast or irregular heartbeat. · You have symptoms of a stroke. These may include:  ¨ Sudden numbness, tingling, weakness, or loss of movement in your face, arm, or leg, especially on only one side of your body. ¨ Sudden vision changes. ¨ Sudden trouble speaking. ¨ Sudden confusion or trouble understanding simple statements. ¨ Sudden problems with walking or balance. ¨ A sudden, severe headache that is different from past headaches. · You have severe back or belly pain. Do not wait until your blood pressure comes down on its own. Get help right away. Call your doctor now or seek immediate care if:  · Your blood pressure is much higher than normal (such as 180/110 or higher), but you don't have symptoms. · You think high blood pressure is causing symptoms, such as:  ¨ Severe headache. ¨ Blurry vision. Watch closely for changes in your health, and be sure to contact your doctor if:  · Your blood pressure measures 140/90 or higher at least 2 times. That means the top number is 140 or higher or the bottom number is 90 or higher, or both. · You think you may be having side effects from your blood pressure medicine. · Your blood pressure is usually normal, but it goes above normal at least 2 times. Where can you learn more? Go to http://mariann-mic.info/.   Enter B918 in the search box to learn more about \"High Blood Pressure: Care Instructions. \"  Current as of: August 8, 2016  Content Version: 11.1  © 1059-9943 [employer]. Care instructions adapted under license by [employer] (which disclaims liability or warranty for this information). If you have questions about a medical condition or this instruction, always ask your healthcare professional. [address] any warranty or liability for your use of this information. COPD Exacerbation Plan: Care Instructions  Your Care Instructions  If you have chronic obstructive pulmonary disease (COPD), your usual shortness of breath could suddenly get worse. You may start coughing more and have more mucus. This flare-up is called a COPD exacerbation (say \"yw-FJI-ix-BAY-shun\"). A lung infection or air pollution could set off an exacerbation. Sometimes it can happen after a quick change in temperature or being around chemicals. Work with your doctor to make a plan for dealing with an exacerbation. You can better manage it if you plan ahead. Follow-up care is a key part of your treatment and safety. Be sure to make and go to all appointments, and call your doctor if you are having problems. It's also a good idea to know your test results and keep a list of the medicines you take. How can you care for yourself at home? During an exacerbation  · Do not panic if you start to have one. Quick treatment at home may help you prevent serious breathing problems. If you have a COPD exacerbation plan that you developed with your doctor, follow it. · Take your medicines exactly as your doctor tells you. ¨ Use your inhaler as directed by your doctor. If your symptoms do not get better after you use your medicine, have someone take you to the emergency room. Call an ambulance if necessary. ¨ With inhaled medicines, a spacer or a nebulizer may help you get more medicine to your lungs.  Ask your doctor or pharmacist how to use them properly. Practice using the spacer in front of a mirror before you have an exacerbation. This may help you get the medicine into your lungs quickly. ¨ If your doctor has given you steroid pills, take them as directed. ¨ Your doctor may have given you a prescription for antibiotics, which you can fill if you need it. ¨ Talk to your doctor if you have any problems with your medicine. And call your doctor if you have to use your antibiotic or steroid pills. Preventing an exacerbation  · Do not smoke. This is the most important step you can take to prevent more damage to your lungs and prevent problems. If you already smoke, it is never too late to stop. If you need help quitting, talk to your doctor about stop-smoking programs and medicines. These can increase your chances of quitting for good. · Take your daily medicines as prescribed. · Avoid colds and flu. ¨ Get a pneumococcal vaccine. ¨ Get a flu vaccine each year, as soon as it is available. Ask those you live or work with to do the same, so they will not get the flu and infect you. ¨ Try to stay away from people with colds or the flu. ¨ Wash your hands often. · Avoid secondhand smoke; air pollution; cold, dry air; hot, humid air; and high altitudes. Stay at home with your windows closed when air pollution is bad. · Learn breathing techniques for COPD, such as breathing through pursed lips. These techniques can help you breathe easier during an exacerbation. When should you call for help? Call 911 anytime you think you may need emergency care. For example, call if:  · You have severe trouble breathing. · You have severe chest pain. Call your doctor now or seek immediate medical care if:  · You have new or worse shortness of breath. · You develop new chest pain. · You are coughing more deeply or more often, especially if you notice more mucus or a change in the color of your mucus. · You cough up blood.   · You have new or increased swelling in your legs or belly. · You have a fever. Watch closely for changes in your health, and be sure to contact your doctor if:  · You need to use your antibiotic or steroid pills. · Your symptoms are getting worse. Where can you learn more? Go to http://mariann-mic.info/. Enter S986 in the search box to learn more about \"COPD Exacerbation Plan: Care Instructions. \"  Current as of: July 21, 2016  Content Version: 11.1  © 6004-2146 [employer] innov[employer] mentor [employer]. Care instructions adapted under license by [employer] (which disclaims liability or warranty for this information). If you have questions about a medical condition or this instruction, always ask your healthcare professional. [address] any warranty or liability for your use of this information.

## 2017-02-24 NOTE — MANAGEMENT PLAN
Discharge Plan      Interdisciplinary rounds completed. Discharge Plan is Home today.  Will need Home Health H2H visit      Cyrus White RN BSN  Outcomes Manager  Pager # [phone]

## 2017-02-25 NOTE — ROUTINE PROCESS
1630-Assessment completed,call bell within reach, no distress noted. 1650-discharge instructions given to patient and wife. 1800-discharged home via wheel chair with wife.

## 2017-02-26 LAB
BACTERIA SPEC CULT: NORMAL
BACTERIA SPEC CULT: NORMAL
SERVICE CMNT-IMP: NORMAL
SERVICE CMNT-IMP: NORMAL

## 2017-02-27 ENCOUNTER — TELEPHONE (OUTPATIENT)
Dept: CARDIOLOGY CLINIC | Age: 72
End: 2017-02-27

## 2017-02-27 ENCOUNTER — HOME CARE VISIT (OUTPATIENT)
Dept: HOME HEALTH SERVICES | Facility: HOME HEALTH | Age: 72
End: 2017-02-27

## 2017-02-27 NOTE — TELEPHONE ENCOUNTER
LMOM to dora Davies 30 f/u per Leo Estrada.   Need to find out pt's PCP to obtain [facility] referral.

## 2017-02-27 NOTE — PROGRESS NOTES
Care Management Interventions  PCP Verified by CM: Yes  Mode of Transport at Discharge:  Other (see comment) (wife)  Transition of Care Consult (CM Consult): [address]: Yes  Current Support Network: Own Home, Lives with Spouse  Confirm Follow Up Transport: Self  Plan discussed with Pt/Family/Caregiver: Yes  Freedom of Choice Offered: Yes  Discharge Location  Discharge Placement: Home with home health [facility] to Home Visit)

## 2017-02-28 ENCOUNTER — HOME CARE VISIT (OUTPATIENT)
Dept: HOME HEALTH SERVICES | Facility: HOME HEALTH | Age: 72
End: 2017-02-28

## 2017-03-01 ENCOUNTER — HOME CARE VISIT (OUTPATIENT)
Dept: HOME HEALTH SERVICES | Facility: HOME HEALTH | Age: 72
End: 2017-03-01

## 2017-03-06 ENCOUNTER — TELEPHONE (OUTPATIENT)
Dept: CARDIOLOGY CLINIC | Age: 72
End: 2017-03-06

## 2017-03-06 NOTE — TELEPHONE ENCOUNTER
First number in [place] is no longer pt's number. I removed it. The cell number does not accept incoming calls. Mailing letter to dora Davies 30 f/u.

## 2017-03-21 ENCOUNTER — TELEPHONE (OUTPATIENT)
Dept: CARDIOLOGY CLINIC | Age: 72
End: 2017-03-21

## 2017-03-21 NOTE — TELEPHONE ENCOUNTER
Patient called because he received a letter from our office to schedule a post hospital follow up.   Patient stated that he was going to see his PCP first and would call back if PCP recommended he see a cardiologist.